# Patient Record
Sex: FEMALE | Race: WHITE | NOT HISPANIC OR LATINO | Employment: FULL TIME | URBAN - METROPOLITAN AREA
[De-identification: names, ages, dates, MRNs, and addresses within clinical notes are randomized per-mention and may not be internally consistent; named-entity substitution may affect disease eponyms.]

---

## 2020-09-21 ENCOUNTER — OFFICE VISIT (OUTPATIENT)
Dept: FAMILY MEDICINE CLINIC | Facility: CLINIC | Age: 45
End: 2020-09-21

## 2020-09-21 ENCOUNTER — HOSPITAL ENCOUNTER (OUTPATIENT)
Dept: GENERAL RADIOLOGY | Facility: HOSPITAL | Age: 45
Discharge: HOME | End: 2020-09-21
Admitting: PHYSICIAN ASSISTANT

## 2020-09-21 VITALS
WEIGHT: 293 LBS | BODY MASS INDEX: 43.4 KG/M2 | RESPIRATION RATE: 15 BRPM | TEMPERATURE: 98.4 F | HEART RATE: 73 BPM | DIASTOLIC BLOOD PRESSURE: 88 MMHG | OXYGEN SATURATION: 99 % | HEIGHT: 69 IN | SYSTOLIC BLOOD PRESSURE: 138 MMHG

## 2020-09-21 DIAGNOSIS — Z51.81 MEDICATION MONITORING ENCOUNTER: ICD-10-CM

## 2020-09-21 DIAGNOSIS — Z11.59 NEED FOR HEPATITIS C SCREENING TEST: ICD-10-CM

## 2020-09-21 DIAGNOSIS — E66.01 MORBID OBESITY (HCC): ICD-10-CM

## 2020-09-21 DIAGNOSIS — M54.12 CERVICAL RADICULOPATHY: ICD-10-CM

## 2020-09-21 DIAGNOSIS — Z13.6 ENCOUNTER FOR LIPID SCREENING FOR CARDIOVASCULAR DISEASE: ICD-10-CM

## 2020-09-21 DIAGNOSIS — I10 ESSENTIAL HYPERTENSION: Primary | ICD-10-CM

## 2020-09-21 DIAGNOSIS — F41.8 DEPRESSION WITH ANXIETY: ICD-10-CM

## 2020-09-21 DIAGNOSIS — R53.82 CHRONIC FATIGUE: ICD-10-CM

## 2020-09-21 DIAGNOSIS — M25.511 ACUTE PAIN OF RIGHT SHOULDER: ICD-10-CM

## 2020-09-21 DIAGNOSIS — R29.898 SHOULDER WEAKNESS: ICD-10-CM

## 2020-09-21 DIAGNOSIS — M67.911 DISORDER OF RIGHT ROTATOR CUFF: ICD-10-CM

## 2020-09-21 DIAGNOSIS — E55.9 VITAMIN D DEFICIENCY: ICD-10-CM

## 2020-09-21 DIAGNOSIS — Z13.220 ENCOUNTER FOR LIPID SCREENING FOR CARDIOVASCULAR DISEASE: ICD-10-CM

## 2020-09-21 DIAGNOSIS — S49.91XA INJURY OF RIGHT SHOULDER, INITIAL ENCOUNTER: ICD-10-CM

## 2020-09-21 PROCEDURE — 99204 OFFICE O/P NEW MOD 45 MIN: CPT | Performed by: PHYSICIAN ASSISTANT

## 2020-09-21 PROCEDURE — 73030 X-RAY EXAM OF SHOULDER: CPT

## 2020-09-21 PROCEDURE — 72050 X-RAY EXAM NECK SPINE 4/5VWS: CPT

## 2020-09-21 RX ORDER — ORPHENADRINE CITRATE 100 MG/1
100 TABLET, EXTENDED RELEASE ORAL 2 TIMES DAILY
COMMUNITY
End: 2021-09-16

## 2020-09-21 RX ORDER — FUROSEMIDE 20 MG/1
20 TABLET ORAL AS NEEDED
COMMUNITY
End: 2021-11-18 | Stop reason: SDUPTHER

## 2020-09-21 RX ORDER — POTASSIUM CHLORIDE 600 MG/1
8 TABLET, FILM COATED, EXTENDED RELEASE ORAL DAILY
COMMUNITY
End: 2021-11-18 | Stop reason: SDUPTHER

## 2020-09-21 RX ORDER — TIZANIDINE 4 MG/1
4 TABLET ORAL AS NEEDED
COMMUNITY
End: 2020-12-01 | Stop reason: SDUPTHER

## 2020-09-21 RX ORDER — LEVOCETIRIZINE DIHYDROCHLORIDE 5 MG/1
5 TABLET, FILM COATED ORAL EVERY EVENING
COMMUNITY
End: 2020-10-12 | Stop reason: SDUPTHER

## 2020-09-21 RX ORDER — ATENOLOL 100 MG/1
100 TABLET ORAL DAILY
COMMUNITY
End: 2020-10-12 | Stop reason: SDUPTHER

## 2020-09-21 RX ORDER — ARIPIPRAZOLE 2 MG/1
2 TABLET ORAL DAILY
Qty: 30 TABLET | Refills: 0 | Status: SHIPPED | OUTPATIENT
Start: 2020-09-21 | End: 2020-11-16

## 2020-09-21 RX ORDER — NAPROXEN 500 MG/1
500 TABLET ORAL
COMMUNITY
End: 2020-09-21

## 2020-09-21 RX ORDER — DOXYCYCLINE 50 MG/1
50 CAPSULE ORAL DAILY
COMMUNITY
End: 2021-02-24

## 2020-09-21 RX ORDER — VENLAFAXINE HYDROCHLORIDE 150 MG/1
150 CAPSULE, EXTENDED RELEASE ORAL DAILY
COMMUNITY
End: 2021-03-24 | Stop reason: SDUPTHER

## 2020-09-21 RX ORDER — KETOROLAC TROMETHAMINE 10 MG/1
10 TABLET, FILM COATED ORAL EVERY 6 HOURS PRN
COMMUNITY
End: 2021-09-16

## 2020-09-21 RX ORDER — PREDNISONE 10 MG/1
TABLET ORAL
Qty: 21 EACH | Refills: 0 | Status: SHIPPED | OUTPATIENT
Start: 2020-09-21 | End: 2021-02-24

## 2020-09-21 RX ORDER — LOSARTAN POTASSIUM 100 MG/1
100 TABLET ORAL DAILY
COMMUNITY
End: 2020-12-01

## 2020-09-21 RX ORDER — ESOMEPRAZOLE MAGNESIUM 40 MG/1
40 CAPSULE, DELAYED RELEASE ORAL
COMMUNITY
End: 2021-02-15 | Stop reason: SDUPTHER

## 2020-09-21 NOTE — PROGRESS NOTES
Subjective   Francia Rivera is a 45 y.o. female.     History of Present Illness   Pt presents to establish care. Former patient in [facility]. Recently moved here with    R shoulder pain. R hand feels weak. Started suddenly a few days ago. Reaching for something when it started hurting   Posterior shoulder blade and radiates down arm, Worse with laying down. R sided side sleeper. Right hand dominant   tendonitis of R shoulder in the past. This feels worse.   Feels like hot poker sensation going down arm.   Went to [facility] ER. Did not obtain XR. Normal EKG   Never had MRI of shoulder in the past   ER prescribed norflex and Toradol in ER 9/19/20. Pain not gotten any better. Shot in ER helped her sleep for a couple hours.    Does some lifting and pulling at work. 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   Low vit D   Thyroid abnormal, but did not go back for recheck as directed   No issues with blood sugar or cholesterol     Cardiac stress testing in the past was normal    swelling in LE taking lasix and klor-con   No hx of DVT   Has varicose veins     GERD history. Taking nexium   Rosacea, taking 50 mg capsule X last few months. Seems to be helping. Prescribed by PCP office       effexor worked well at first. Very ogden. Hx of anxiety and depression. Been on for 3 years. Does not seem like medicine is doing as much   Lexapro, wellbutrin, zoloft, buspar in the past- no major side effects, just felt like they did not help much      Notes chronic fatigue     Has HTN. Takes atenolol and losartan. Does not check BP at home. Denies cardiac symptoms     The following portions of the patient's history were reviewed and updated as appropriate: allergies, current  "medications, past family history, past medical history, past social history, past surgical history and problem list.    Review of Systems   Constitutional: Positive for fatigue. Negative for chills, diaphoresis and fever.   HENT: Negative.  Negative for congestion, ear discharge, ear pain, hearing loss, nosebleeds, postnasal drip, sinus pressure, sneezing and sore throat.    Eyes: Negative.    Respiratory: Negative.  Negative for cough, chest tightness, shortness of breath and wheezing.    Cardiovascular: Positive for leg swelling. Negative for chest pain and palpitations.   Gastrointestinal: Negative for abdominal distention, abdominal pain, blood in stool, constipation, diarrhea, nausea and vomiting.   Genitourinary: Negative.  Negative for difficulty urinating, dysuria, flank pain, frequency, hematuria and urgency.   Musculoskeletal: Positive for arthralgias, back pain, neck pain and neck stiffness. Negative for gait problem, joint swelling and myalgias.   Skin: Negative.  Negative for color change, pallor, rash and wound.   Neurological: Positive for weakness. Negative for dizziness, syncope, light-headedness, numbness and headaches.       Objective    Blood pressure 138/88, pulse 73, temperature 98.4 °F (36.9 °C), resp. rate 15, height 175.3 cm (69\"), weight (!) 139 kg (307 lb), SpO2 99 %.   Body mass index is 45.34 kg/m².     Physical Exam  Vitals signs and nursing note reviewed.   Constitutional:       Appearance: She is well-developed. She is obese.   HENT:      Head: Normocephalic and atraumatic.      Right Ear: Tympanic membrane, ear canal and external ear normal.      Left Ear: Tympanic membrane, ear canal and external ear normal.      Nose: Nose normal.      Mouth/Throat:      Pharynx: No oropharyngeal exudate.   Eyes:      Conjunctiva/sclera: Conjunctivae normal.   Neck:      Musculoskeletal: Normal range of motion and neck supple.      Thyroid: No thyromegaly.      Trachea: No tracheal deviation. "   Cardiovascular:      Rate and Rhythm: Normal rate and regular rhythm.      Heart sounds: Normal heart sounds.   Pulmonary:      Effort: Pulmonary effort is normal. No respiratory distress.      Breath sounds: Normal breath sounds. No wheezing or rales.   Chest:      Chest wall: No tenderness.   Abdominal:      General: Bowel sounds are normal. There is no distension.      Palpations: Abdomen is soft. There is no mass.      Tenderness: There is no abdominal tenderness. There is no guarding or rebound.      Hernia: No hernia is present.   Musculoskeletal:         General: No deformity.      Right shoulder: She exhibits decreased range of motion, tenderness, bony tenderness, pain and decreased strength.      Cervical back: She exhibits tenderness and bony tenderness. She exhibits normal range of motion.   Lymphadenopathy:      Cervical: No cervical adenopathy.   Skin:     General: Skin is warm and dry.   Neurological:      Mental Status: She is alert and oriented to person, place, and time.   Psychiatric:         Behavior: Behavior normal.         Thought Content: Thought content normal.         Judgment: Judgment normal.         Assessment/Plan   Francia was seen today for establish care and shoulder pain.    Diagnoses and all orders for this visit:    Essential hypertension  -     CBC & Differential  -     Comprehensive Metabolic Panel    Chronic fatigue  -     TSH  -     T4, free  -     Iron Profile  -     Vitamin B12    Vitamin D deficiency  -     Vitamin D 25 Hydroxy    Encounter for lipid screening for cardiovascular disease  -     Lipid Panel    Need for hepatitis C screening test  -     Hepatitis C Antibody    Medication monitoring encounter  -     CBC & Differential  -     Comprehensive Metabolic Panel  -     Magnesium    Depression with anxiety  -     ARIPiprazole (Abilify) 2 MG tablet; Take 1 tablet by mouth Daily.    Acute pain of right shoulder  -     predniSONE (DELTASONE) 10 MG (21) dose pack; Use as  directed on package  -     MRI Shoulder Right Without Contrast  -     XR Shoulder 2+ View Right    Cervical radiculopathy  -     predniSONE (DELTASONE) 10 MG (21) dose pack; Use as directed on package  -     XR Spine Cervical Complete 4 or 5 View    Shoulder weakness  -     MRI Shoulder Right Without Contrast    Injury of right shoulder, initial encounter  -     MRI Shoulder Right Without Contrast  -     XR Shoulder 2+ View Right    Disorder of right rotator cuff  -     MRI Shoulder Right Without Contrast    Morbid obesity (CMS/HCC)    Steroid taper as noted for shoulder pain. No additional NSAIDs while taking. May take muscle relaxer prn   Issue may be stemming from cervical spine with signs of radiculopathy   Check imaging as outlined in plan   Labs as outlined in plan. Will be due for all med refills in next few weeks   Will add Abilify to effexor due to persistent depression with anxiety

## 2020-09-22 LAB
25(OH)D3+25(OH)D2 SERPL-MCNC: 31 NG/ML (ref 30–100)
ALBUMIN SERPL-MCNC: 4.1 G/DL (ref 3.8–4.8)
ALBUMIN/GLOB SERPL: 1.4 {RATIO} (ref 1.2–2.2)
ALP SERPL-CCNC: 101 IU/L (ref 39–117)
ALT SERPL-CCNC: 20 IU/L (ref 0–32)
AST SERPL-CCNC: 16 IU/L (ref 0–40)
BASOPHILS # BLD AUTO: 0 X10E3/UL (ref 0–0.2)
BASOPHILS NFR BLD AUTO: 0 %
BILIRUB SERPL-MCNC: <0.2 MG/DL (ref 0–1.2)
BUN SERPL-MCNC: 20 MG/DL (ref 6–24)
BUN/CREAT SERPL: 24 (ref 9–23)
CALCIUM SERPL-MCNC: 9.3 MG/DL (ref 8.7–10.2)
CHLORIDE SERPL-SCNC: 101 MMOL/L (ref 96–106)
CHOLEST SERPL-MCNC: 156 MG/DL (ref 100–199)
CO2 SERPL-SCNC: 29 MMOL/L (ref 20–29)
CREAT SERPL-MCNC: 0.83 MG/DL (ref 0.57–1)
EOSINOPHIL # BLD AUTO: 0.1 X10E3/UL (ref 0–0.4)
EOSINOPHIL NFR BLD AUTO: 1 %
ERYTHROCYTE [DISTWIDTH] IN BLOOD BY AUTOMATED COUNT: 13.4 % (ref 11.7–15.4)
GLOBULIN SER CALC-MCNC: 2.9 G/DL (ref 1.5–4.5)
GLUCOSE SERPL-MCNC: 90 MG/DL (ref 65–99)
HCT VFR BLD AUTO: 42.2 % (ref 34–46.6)
HCV AB S/CO SERPL IA: <0.1 S/CO RATIO (ref 0–0.9)
HDLC SERPL-MCNC: 60 MG/DL
HGB BLD-MCNC: 13.6 G/DL (ref 11.1–15.9)
IMM GRANULOCYTES # BLD AUTO: 0 X10E3/UL (ref 0–0.1)
IMM GRANULOCYTES NFR BLD AUTO: 1 %
IRON SATN MFR SERPL: 12 % (ref 15–55)
IRON SERPL-MCNC: 50 UG/DL (ref 27–159)
LDLC SERPL CALC-MCNC: 75 MG/DL (ref 0–99)
LYMPHOCYTES # BLD AUTO: 3 X10E3/UL (ref 0.7–3.1)
LYMPHOCYTES NFR BLD AUTO: 34 %
MAGNESIUM SERPL-MCNC: 2.5 MG/DL (ref 1.6–2.3)
MCH RBC QN AUTO: 28.3 PG (ref 26.6–33)
MCHC RBC AUTO-ENTMCNC: 32.2 G/DL (ref 31.5–35.7)
MCV RBC AUTO: 88 FL (ref 79–97)
MONOCYTES # BLD AUTO: 0.6 X10E3/UL (ref 0.1–0.9)
MONOCYTES NFR BLD AUTO: 6 %
NEUTROPHILS # BLD AUTO: 5.2 X10E3/UL (ref 1.4–7)
NEUTROPHILS NFR BLD AUTO: 58 %
PLATELET # BLD AUTO: 270 X10E3/UL (ref 150–450)
POTASSIUM SERPL-SCNC: 4.4 MMOL/L (ref 3.5–5.2)
PROT SERPL-MCNC: 7 G/DL (ref 6–8.5)
RBC # BLD AUTO: 4.81 X10E6/UL (ref 3.77–5.28)
SODIUM SERPL-SCNC: 142 MMOL/L (ref 134–144)
T4 FREE SERPL-MCNC: 1.05 NG/DL (ref 0.82–1.77)
TIBC SERPL-MCNC: 426 UG/DL (ref 250–450)
TRIGL SERPL-MCNC: 120 MG/DL (ref 0–149)
TSH SERPL DL<=0.005 MIU/L-ACNC: 0.76 UIU/ML (ref 0.45–4.5)
UIBC SERPL-MCNC: 376 UG/DL (ref 131–425)
VIT B12 SERPL-MCNC: 553 PG/ML (ref 232–1245)
VLDLC SERPL CALC-MCNC: 21 MG/DL (ref 5–40)
WBC # BLD AUTO: 8.8 X10E3/UL (ref 3.4–10.8)

## 2020-09-23 ENCOUNTER — TELEPHONE (OUTPATIENT)
Dept: FAMILY MEDICINE CLINIC | Facility: CLINIC | Age: 45
End: 2020-09-23

## 2020-10-06 DIAGNOSIS — M75.101 TEAR OF RIGHT SUPRASPINATUS TENDON: Primary | ICD-10-CM

## 2020-10-12 ENCOUNTER — APPOINTMENT (OUTPATIENT)
Dept: MRI IMAGING | Facility: HOSPITAL | Age: 45
End: 2020-10-12

## 2020-10-12 NOTE — TELEPHONE ENCOUNTER
DELETE AFTER REVIEWING: Telephone encounter to be sent to the clinical pool.  If patient has less than a 3 day supply left, send the encounter HIGH Priority.    Caller: Nicole Francia EASTON    Relationship: Self    Best call back number:   [phone]  Medication needed:   Requested Prescriptions     Pending Prescriptions Disp Refills   • atenolol (TENORMIN) 100 MG tablet       Sig: Take 1 tablet by mouth Daily.   • levocetirizine (XYZAL) 5 MG tablet       Sig: Take 1 tablet by mouth Every Evening.     What is the patient's preferred pharmacy: [facility]Y #[id number] [place], [place] - [address] [place] [address] AT [facility] - [phone]  - [phone]      SHE ALSO NEEDS ESTRADIOL 1 MG

## 2020-10-13 RX ORDER — ATENOLOL 100 MG/1
100 TABLET ORAL DAILY
Qty: 90 TABLET | Refills: 0 | Status: SHIPPED | OUTPATIENT
Start: 2020-10-13 | End: 2021-01-05

## 2020-10-13 RX ORDER — LEVOCETIRIZINE DIHYDROCHLORIDE 5 MG/1
5 TABLET, FILM COATED ORAL EVERY EVENING
Qty: 90 TABLET | Refills: 0 | Status: SHIPPED | OUTPATIENT
Start: 2020-10-13 | End: 2021-09-16

## 2020-10-22 ENCOUNTER — OFFICE VISIT (OUTPATIENT)
Dept: ORTHOPEDIC SURGERY | Facility: CLINIC | Age: 45
End: 2020-10-22

## 2020-10-22 VITALS — BODY MASS INDEX: 43.4 KG/M2 | HEIGHT: 69 IN | HEART RATE: 81 BPM | WEIGHT: 293 LBS | OXYGEN SATURATION: 99 %

## 2020-10-22 DIAGNOSIS — R20.2 NUMBNESS AND TINGLING IN RIGHT HAND: ICD-10-CM

## 2020-10-22 DIAGNOSIS — M54.2 NECK PAIN: ICD-10-CM

## 2020-10-22 DIAGNOSIS — M75.111 NONTRAUMATIC INCOMPLETE TEAR OF RIGHT ROTATOR CUFF: Primary | ICD-10-CM

## 2020-10-22 DIAGNOSIS — R20.0 NUMBNESS AND TINGLING IN RIGHT HAND: ICD-10-CM

## 2020-10-22 DIAGNOSIS — M75.51 BURSITIS OF RIGHT SHOULDER: ICD-10-CM

## 2020-10-22 DIAGNOSIS — M75.41 IMPINGEMENT SYNDROME OF RIGHT SHOULDER: ICD-10-CM

## 2020-10-22 PROCEDURE — 99204 OFFICE O/P NEW MOD 45 MIN: CPT | Performed by: ORTHOPAEDIC SURGERY

## 2020-10-22 RX ORDER — NAPROXEN 500 MG/1
TABLET ORAL
COMMUNITY
Start: 2020-10-04 | End: 2021-09-16

## 2020-10-22 RX ORDER — LOSARTAN POTASSIUM 50 MG/1
TABLET ORAL
COMMUNITY
Start: 2020-10-07 | End: 2020-12-01

## 2020-10-22 RX ORDER — METRONIDAZOLE 7.5 MG/G
GEL TOPICAL
COMMUNITY
Start: 2020-10-06 | End: 2022-10-19 | Stop reason: SDUPTHER
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
## 2020-10-29 ENCOUNTER — HOSPITAL ENCOUNTER (OUTPATIENT)
Dept: MRI IMAGING | Facility: HOSPITAL | Age: 45
Discharge: HOME | End: 2020-10-29
Admitting: ORTHOPAEDIC SURGERY

## 2020-10-29 DIAGNOSIS — R20.0 NUMBNESS AND TINGLING IN RIGHT HAND: ICD-10-CM

## 2020-10-29 DIAGNOSIS — M54.2 NECK PAIN: ICD-10-CM

## 2020-10-29 DIAGNOSIS — R20.2 NUMBNESS AND TINGLING IN RIGHT HAND: ICD-10-CM

## 2020-10-29 PROCEDURE — 72141 MRI NECK SPINE W/O DYE: CPT

## 2020-11-03 ENCOUNTER — TELEPHONE (OUTPATIENT)
Dept: ORTHOPEDIC SURGERY | Facility: CLINIC | Age: 45
End: 2020-11-03

## 2020-11-03 ENCOUNTER — TELEPHONE (OUTPATIENT)
Dept: FAMILY MEDICINE CLINIC | Facility: CLINIC | Age: 45
End: 2020-11-03

## 2020-11-03 DIAGNOSIS — R23.2 HOT FLASHES: Primary | ICD-10-CM

## 2020-11-03 RX ORDER — ESTRADIOL 1 MG/1
1 TABLET ORAL DAILY
Qty: 30 TABLET | Refills: 2 | Status: SHIPPED | OUTPATIENT
Start: 2020-11-03 | End: 2021-04-19

## 2020-11-03 NOTE — TELEPHONE ENCOUNTER
Patient said she was prescribed it on April 18, 2018, the day she had a complete hysterectomy. She said she has hot flashes, night sweats, and irritability. Per patient and [facility] it is Estradiol 1 mg one capsule po qd #30.  Patient said her nurse practioner at her other practice prescribed it for her. Patient asked if there was any way you could take it over, she said she doesn't have a lot of extra money to go to other appointments.

## 2020-11-03 NOTE — TELEPHONE ENCOUNTER
Spoke with pt letting her know [employer] message; She reports being scheduled for her EMG but does not remember exactly what the date was. She reports being scheduled to see Dr. Ordoñez on 11/19 and I told her to keep that appt if her EMG study has been done but if for some reason she hasn't had the EMG by that time, I told her to give us a call to re-schedule that appt. She understood.    Felecia

## 2020-11-03 NOTE — TELEPHONE ENCOUNTER
Regarding: Prescription Question  Contact: [phone]  ----- Message from CELENA Aguilar sent at 11/3/2020  8:40 AM EST -----       ----- Message from Francia Rivera to Mario Rainey PA sent at 11/3/2020  5:10 AM -----   Estirdol  Learn more  Comments: This was given to me by BRANDON Bolanos. 1 mg once a day. I need it refilled

## 2020-11-03 NOTE — TELEPHONE ENCOUNTER
Mario Rainey PA 6 hours ago (8:40 AM)        Not in medication list. Please call patient and pharmacy to confirm exact prescription, dosing and quantity. What are her menopausal symptoms? How long has she been on medication. Has she had a complete hysterectomy or just partial?  I do not typically manage hormone therapy, however I will provide Rx until we can get her established with local GYN. CHERELLE

## 2020-11-03 NOTE — TELEPHONE ENCOUNTER
Rx sent to pharmacy. I will take over for awhile, however do encourage patient to stay on top of yearly mammograms and consider stopping HRT after she has been on it for 5 years. CHERELLE

## 2020-11-03 NOTE — TELEPHONE ENCOUNTER
----- Message from Fortino Ordoñez MD sent at 11/3/2020 12:20 PM EST -----  Please inform the patient the I have reviewed her neck MRI.  She does have some mild disc herniation changes but should be able to respond to conservative treatment.  The EMG/NCV study will still be helpful (can we see if this is scheduled?)    I am happy to see her to go over the results and reassess.  ----- Message -----  From: Interface, Rad [place] Valves In  Sent: 10/30/2020   1:12 PM EST  To: Fortino Ordoñez MD

## 2020-11-13 DIAGNOSIS — R20.2 NUMBNESS AND TINGLING IN RIGHT HAND: ICD-10-CM

## 2020-11-13 DIAGNOSIS — R20.0 NUMBNESS AND TINGLING IN RIGHT HAND: ICD-10-CM

## 2020-11-13 DIAGNOSIS — M54.2 NECK PAIN: ICD-10-CM

## 2020-11-14 DIAGNOSIS — F41.8 DEPRESSION WITH ANXIETY: ICD-10-CM

## 2020-11-16 RX ORDER — ARIPIPRAZOLE 2 MG/1
TABLET ORAL
Qty: 30 TABLET | Refills: 0 | Status: SHIPPED | OUTPATIENT
Start: 2020-11-16 | End: 2020-12-15

## 2020-11-20 ENCOUNTER — TELEPHONE (OUTPATIENT)
Dept: ORTHOPEDIC SURGERY | Facility: CLINIC | Age: 45
End: 2020-11-20

## 2020-12-01 RX ORDER — LOSARTAN POTASSIUM 100 MG/1
100 TABLET ORAL DAILY
Qty: 90 TABLET | Refills: 0 | Status: SHIPPED | OUTPATIENT
Start: 2020-12-01 | End: 2021-03-29

## 2020-12-01 RX ORDER — TIZANIDINE 4 MG/1
4 TABLET ORAL EVERY 8 HOURS PRN
Qty: 90 TABLET | Refills: 3 | Status: SHIPPED | OUTPATIENT
Start: 2020-12-01 | End: 2021-03-24 | Stop reason: SDUPTHER

## 2020-12-15 DIAGNOSIS — F41.8 DEPRESSION WITH ANXIETY: ICD-10-CM

## 2020-12-15 RX ORDER — ARIPIPRAZOLE 2 MG/1
TABLET ORAL
Qty: 30 TABLET | Refills: 0 | Status: SHIPPED | OUTPATIENT
Start: 2020-12-15 | End: 2021-06-19 | Stop reason: SDUPTHER

## 2021-01-05 RX ORDER — ATENOLOL 100 MG/1
TABLET ORAL
Qty: 90 TABLET | Refills: 0 | Status: SHIPPED | OUTPATIENT
Start: 2021-01-05 | End: 2021-04-05

## 2021-02-15 RX ORDER — ESOMEPRAZOLE MAGNESIUM 40 MG/1
40 CAPSULE, DELAYED RELEASE ORAL
Qty: 90 CAPSULE | Refills: 1 | Status: SHIPPED | OUTPATIENT
Start: 2021-02-15 | End: 2021-06-19 | Stop reason: SDUPTHER

## 2021-02-24 ENCOUNTER — OFFICE VISIT (OUTPATIENT)
Dept: FAMILY MEDICINE CLINIC | Facility: CLINIC | Age: 46
End: 2021-02-24

## 2021-02-24 VITALS
HEART RATE: 78 BPM | BODY MASS INDEX: 43.4 KG/M2 | DIASTOLIC BLOOD PRESSURE: 82 MMHG | TEMPERATURE: 98.2 F | WEIGHT: 293 LBS | SYSTOLIC BLOOD PRESSURE: 124 MMHG | HEIGHT: 69 IN | RESPIRATION RATE: 18 BRPM

## 2021-02-24 DIAGNOSIS — R07.9 CHEST PAIN, UNSPECIFIED TYPE: ICD-10-CM

## 2021-02-24 DIAGNOSIS — R11.0 NAUSEA: ICD-10-CM

## 2021-02-24 DIAGNOSIS — R10.13 EPIGASTRIC PAIN: Primary | ICD-10-CM

## 2021-02-24 LAB
BILIRUB BLD-MCNC: NEGATIVE MG/DL
CLARITY, POC: CLEAR
COLOR UR: YELLOW
GLUCOSE UR STRIP-MCNC: NEGATIVE MG/DL
KETONES UR QL: NEGATIVE
LEUKOCYTE EST, POC: NEGATIVE
NITRITE UR-MCNC: NEGATIVE MG/ML
PH UR: 8 [PH] (ref 5–8)
PROT UR STRIP-MCNC: ABNORMAL MG/DL
RBC # UR STRIP: NEGATIVE /UL
SP GR UR: 1.01 (ref 1–1.03)
UROBILINOGEN UR QL: NORMAL

## 2021-02-24 PROCEDURE — 81003 URINALYSIS AUTO W/O SCOPE: CPT | Performed by: PHYSICIAN ASSISTANT

## 2021-02-24 PROCEDURE — 99213 OFFICE O/P EST LOW 20 MIN: CPT | Performed by: PHYSICIAN ASSISTANT

## 2021-02-24 RX ORDER — SUCRALFATE 1 G/1
1 TABLET ORAL 4 TIMES DAILY
Qty: 60 TABLET | Refills: 0 | Status: SHIPPED | OUTPATIENT
Start: 2021-02-24 | End: 2021-04-12

## 2021-02-24 RX ORDER — CLINDAMYCIN HYDROCHLORIDE 300 MG/1
CAPSULE ORAL
COMMUNITY
Start: 2021-02-22 | End: 2021-09-16

## 2021-02-24 RX ORDER — HYDROCODONE BITARTRATE AND ACETAMINOPHEN 5; 325 MG/1; MG/1
TABLET ORAL
COMMUNITY
Start: 2021-02-22 | End: 2021-09-16

## 2021-02-24 RX ORDER — ONDANSETRON 4 MG/1
4 TABLET, ORALLY DISINTEGRATING ORAL EVERY 8 HOURS PRN
Qty: 20 TABLET | Refills: 0 | Status: SHIPPED | OUTPATIENT
Start: 2021-02-24

## 2021-02-24 NOTE — PROGRESS NOTES
Subjective   Francia Rivera is a 45 y.o. female.     History of Present Illness   Pt presents with CC of abdominal pain that started over the weekend   Pain is in epigastric region   Having decreased appetite, nausea and some heartburn   Some heartburn (despite taking nexium) and chest pain   Notes she has been taking a lot of ibuprofen over the last several months  Pt does have hx of stomach ulcer  No recent EGD   Hx of gallbladder surgery   No fever, vomiting or URI symptoms   Last BM 5 days ago. States it is normal for her to go this long without BM. No noticed blood or tarry stools as she has not gone in several days  No hx of pancreatitis     The following portions of the patient's history were reviewed and updated as appropriate: allergies, current medications, past family history, past medical history, past social history, past surgical history and problem list.    Review of Systems   Constitutional: Positive for appetite change. Negative for chills, diaphoresis, fatigue and fever.   HENT: Negative.  Negative for congestion, ear discharge, ear pain, hearing loss, nosebleeds, postnasal drip, sinus pressure, sneezing and sore throat.    Eyes: Negative.    Respiratory: Negative.  Negative for cough, chest tightness, shortness of breath and wheezing.    Cardiovascular: Positive for chest pain. Negative for palpitations and leg swelling.   Gastrointestinal: Positive for abdominal pain, constipation and nausea. Negative for abdominal distention, blood in stool, diarrhea and vomiting.   Genitourinary: Negative.  Negative for difficulty urinating, dysuria, flank pain, frequency, hematuria and urgency.   Musculoskeletal: Negative.  Negative for arthralgias, back pain, gait problem, joint swelling, myalgias, neck pain and neck stiffness.   Skin: Negative.  Negative for color change, pallor, rash and wound.   Neurological: Negative for dizziness, syncope, weakness, light-headedness, numbness and headaches.  "      Objective    Blood pressure 124/82, pulse 78, temperature 98.2 °F (36.8 °C), resp. rate 18, height 175.3 cm (69\"), weight 134 kg (296 lb).     Physical Exam  Vitals signs and nursing note reviewed.   Constitutional:       Appearance: Normal appearance. She is well-developed.      Comments: Appears in pain    HENT:      Head: Normocephalic and atraumatic.      Right Ear: Tympanic membrane, ear canal and external ear normal.      Left Ear: Tympanic membrane, ear canal and external ear normal.      Nose: Nose normal.   Eyes:      Conjunctiva/sclera: Conjunctivae normal.   Neck:      Musculoskeletal: Normal range of motion and neck supple.      Thyroid: No thyromegaly.      Trachea: No tracheal deviation.   Cardiovascular:      Rate and Rhythm: Normal rate and regular rhythm.      Heart sounds: Normal heart sounds. No murmur.   Pulmonary:      Effort: Pulmonary effort is normal. No respiratory distress.      Breath sounds: Normal breath sounds. No wheezing or rales.   Chest:      Chest wall: No tenderness.   Abdominal:      General: Bowel sounds are normal. There is no distension.      Palpations: Abdomen is soft. There is no mass.      Tenderness: There is abdominal tenderness in the epigastric area. There is no guarding or rebound.      Hernia: No hernia is present.   Lymphadenopathy:      Cervical: No cervical adenopathy.   Skin:     General: Skin is warm and dry.   Neurological:      Mental Status: She is alert and oriented to person, place, and time.   Psychiatric:         Mood and Affect: Mood normal.         Behavior: Behavior normal.         Thought Content: Thought content normal.         Judgment: Judgment normal.     Procedures      Assessment/Plan   Diagnoses and all orders for this visit:    1. Epigastric pain (Primary)  -     POCT urinalysis dipstick, automated  -     CBC & Differential  -     Comprehensive Metabolic Panel  -     Lipase  -     CT Abdomen Pelvis With Contrast  -     sucralfate " (Carafate) 1 g tablet; Take 1 tablet by mouth 4 (Four) Times a Day.  Dispense: 60 tablet; Refill: 0    2. Nausea  -     POCT urinalysis dipstick, automated  -     CBC & Differential  -     Comprehensive Metabolic Panel  -     Lipase  -     CT Abdomen Pelvis With Contrast  -     ondansetron ODT (Zofran ODT) 4 MG disintegrating tablet; Place 1 tablet on the tongue Every 8 (Eight) Hours As Needed for Nausea or Vomiting.  Dispense: 20 tablet; Refill: 0    3. Chest pain, unspecified type  -     Cancel: ECG 12 Lead  -     CT Abdomen Pelvis With Contrast    EKG ordered, however patient had to leave suddenly due to family emergency   Would like to obtain CT scan   Possible ulceration or gastritis due to frequent NSAID use   Symptomatic treatment as outlined in plan. Samples of dexilant provided to replace Nexium.  Pt to follow back up when she can

## 2021-02-25 ENCOUNTER — PATIENT MESSAGE (OUTPATIENT)
Dept: FAMILY MEDICINE CLINIC | Facility: CLINIC | Age: 46
End: 2021-02-25

## 2021-02-25 NOTE — TELEPHONE ENCOUNTER
From: Francia Rivera  Sent: 2/25/2021 11:36 AM EST  To: [facility]  Subject: RE: Visit Follow-Up Question    I had to leave early an she was going to do some test on me. I wasn’t sure if I needed to come back in or would they called to schedule the test?

## 2021-03-10 ENCOUNTER — APPOINTMENT (OUTPATIENT)
Dept: CT IMAGING | Facility: HOSPITAL | Age: 46
End: 2021-03-10

## 2021-03-24 DIAGNOSIS — F41.8 DEPRESSION WITH ANXIETY: Primary | ICD-10-CM

## 2021-03-24 RX ORDER — VENLAFAXINE HYDROCHLORIDE 150 MG/1
150 CAPSULE, EXTENDED RELEASE ORAL DAILY
Qty: 30 CAPSULE | Refills: 0 | Status: SHIPPED | OUTPATIENT
Start: 2021-03-24 | End: 2021-04-19

## 2021-03-29 RX ORDER — TIZANIDINE 4 MG/1
4 TABLET ORAL EVERY 8 HOURS PRN
Qty: 90 TABLET | Refills: 3 | Status: SHIPPED | OUTPATIENT
Start: 2021-03-29 | End: 2021-06-19 | Stop reason: SDUPTHER

## 2021-03-29 RX ORDER — LOSARTAN POTASSIUM 100 MG/1
TABLET ORAL
Qty: 90 TABLET | Refills: 0 | Status: SHIPPED | OUTPATIENT
Start: 2021-03-29 | End: 2021-06-19 | Stop reason: SDUPTHER

## 2021-04-05 RX ORDER — ATENOLOL 100 MG/1
TABLET ORAL
Qty: 90 TABLET | Refills: 0 | Status: SHIPPED | OUTPATIENT
Start: 2021-04-05 | End: 2021-04-27 | Stop reason: SDUPTHER

## 2021-04-12 DIAGNOSIS — R10.13 EPIGASTRIC PAIN: ICD-10-CM

## 2021-04-12 RX ORDER — SUCRALFATE 1 G/1
1 TABLET ORAL 4 TIMES DAILY
Qty: 60 TABLET | Refills: 0 | Status: SHIPPED | OUTPATIENT
Start: 2021-04-12 | End: 2022-05-31

## 2021-04-19 DIAGNOSIS — F41.8 DEPRESSION WITH ANXIETY: ICD-10-CM

## 2021-04-19 DIAGNOSIS — R23.2 HOT FLASHES: ICD-10-CM

## 2021-04-19 RX ORDER — VENLAFAXINE HYDROCHLORIDE 150 MG/1
CAPSULE, EXTENDED RELEASE ORAL
Qty: 30 CAPSULE | Refills: 0 | Status: SHIPPED | OUTPATIENT
Start: 2021-04-19 | End: 2021-05-19

## 2021-04-19 RX ORDER — ESTRADIOL 1 MG/1
TABLET ORAL
Qty: 30 TABLET | Refills: 2 | Status: SHIPPED | OUTPATIENT
Start: 2021-04-19 | End: 2021-06-19 | Stop reason: SDUPTHER

## 2021-04-27 RX ORDER — ATENOLOL 100 MG/1
100 TABLET ORAL DAILY
Qty: 90 TABLET | Refills: 0 | Status: SHIPPED | OUTPATIENT
Start: 2021-04-27 | End: 2021-06-19 | Stop reason: SDUPTHER

## 2021-05-19 DIAGNOSIS — F41.8 DEPRESSION WITH ANXIETY: ICD-10-CM

## 2021-05-19 RX ORDER — VENLAFAXINE HYDROCHLORIDE 150 MG/1
CAPSULE, EXTENDED RELEASE ORAL
Qty: 30 CAPSULE | Refills: 2 | Status: SHIPPED | OUTPATIENT
Start: 2021-05-19 | End: 2021-06-19 | Stop reason: SDUPTHER

## 2021-06-19 DIAGNOSIS — R23.2 HOT FLASHES: ICD-10-CM

## 2021-06-19 DIAGNOSIS — F41.8 DEPRESSION WITH ANXIETY: ICD-10-CM

## 2021-06-21 RX ORDER — ESTRADIOL 1 MG/1
1 TABLET ORAL DAILY
Qty: 30 TABLET | Refills: 2 | Status: SHIPPED | OUTPATIENT
Start: 2021-06-21 | End: 2021-10-18

## 2021-06-21 RX ORDER — ESOMEPRAZOLE MAGNESIUM 40 MG/1
40 CAPSULE, DELAYED RELEASE ORAL
Qty: 90 CAPSULE | Refills: 1 | Status: SHIPPED | OUTPATIENT
Start: 2021-06-21 | End: 2022-01-20

## 2021-06-21 RX ORDER — VENLAFAXINE HYDROCHLORIDE 150 MG/1
150 CAPSULE, EXTENDED RELEASE ORAL DAILY
Qty: 30 CAPSULE | Refills: 2 | Status: SHIPPED | OUTPATIENT
Start: 2021-06-21 | End: 2021-10-04

## 2021-06-21 RX ORDER — ARIPIPRAZOLE 2 MG/1
2 TABLET ORAL DAILY
Qty: 30 TABLET | Refills: 2 | Status: SHIPPED | OUTPATIENT
Start: 2021-06-21 | End: 2021-09-16

## 2021-06-21 RX ORDER — LOSARTAN POTASSIUM 100 MG/1
100 TABLET ORAL DAILY
Qty: 90 TABLET | Refills: 0 | Status: SHIPPED | OUTPATIENT
Start: 2021-06-21 | End: 2021-09-16

## 2021-06-21 RX ORDER — ATENOLOL 100 MG/1
100 TABLET ORAL DAILY
Qty: 90 TABLET | Refills: 0 | Status: SHIPPED | OUTPATIENT
Start: 2021-06-21 | End: 2022-02-01

## 2021-06-21 RX ORDER — TIZANIDINE 4 MG/1
4 TABLET ORAL EVERY 8 HOURS PRN
Qty: 90 TABLET | Refills: 3 | Status: SHIPPED | OUTPATIENT
Start: 2021-06-21 | End: 2021-12-20

## 2021-09-16 ENCOUNTER — OFFICE VISIT (OUTPATIENT)
Dept: FAMILY MEDICINE CLINIC | Facility: CLINIC | Age: 46
End: 2021-09-16

## 2021-09-16 VITALS
SYSTOLIC BLOOD PRESSURE: 132 MMHG | WEIGHT: 290.8 LBS | TEMPERATURE: 97.8 F | HEART RATE: 76 BPM | HEIGHT: 69 IN | RESPIRATION RATE: 12 BRPM | DIASTOLIC BLOOD PRESSURE: 70 MMHG | OXYGEN SATURATION: 98 % | BODY MASS INDEX: 43.07 KG/M2

## 2021-09-16 DIAGNOSIS — E55.9 VITAMIN D DEFICIENCY: ICD-10-CM

## 2021-09-16 DIAGNOSIS — R20.2 PINS AND NEEDLES SENSATION: ICD-10-CM

## 2021-09-16 DIAGNOSIS — E53.8 VITAMIN B12 DEFICIENCY: ICD-10-CM

## 2021-09-16 DIAGNOSIS — M25.50 ARTHRALGIA, UNSPECIFIED JOINT: Primary | ICD-10-CM

## 2021-09-16 PROCEDURE — 99213 OFFICE O/P EST LOW 20 MIN: CPT | Performed by: PHYSICIAN ASSISTANT

## 2021-09-16 RX ORDER — LOSARTAN POTASSIUM 100 MG/1
TABLET ORAL
Qty: 90 TABLET | Refills: 1 | Status: SHIPPED | OUTPATIENT
Start: 2021-09-16 | End: 2022-03-21

## 2021-09-16 NOTE — TELEPHONE ENCOUNTER
Rx Refill Note  Requested Prescriptions     Pending Prescriptions Disp Refills   • losartan (COZAAR) 100 MG tablet [Pharmacy Med Name: LOSARTAN POTASSIUM 100 MG TAB] 90 tablet 0     Sig: TAKE 1 TABLET BY MOUTH EVERY DAY      Last office visit with prescribing clinician: 2/24/2021      Next office visit with prescribing clinician: 9/16/2021            Irina Marin  09/16/21, 07:52 EDT

## 2021-09-16 NOTE — PROGRESS NOTES
"Subjective   Francia Rivera is a 46 y.o. female.     History of Present Illness   Pt presents with CC of of arthralgia of both shoulders, L knee, both ankles and heels. Hands feel stiff and hurt. Overall just achy all over. Symptoms have been going on for about the last month.   Has had medication change of lamictal 25 mg by psychiatry, however this was just started last week after symptoms started  Mother has lupus. No other known autoimmune issues in family   No recent injury or strenuous activity. Job is pretty sedentary   Does feel swollen in hands and feet at times   Sometimes gets pins and needles sensation in hands. Has suspected carpal tunnel     The following portions of the patient's history were reviewed and updated as appropriate: allergies, current medications, past family history, past medical history, past social history, past surgical history and problem list.    Review of Systems   Constitutional: Negative for chills and fever.   Respiratory: Negative for cough, choking, shortness of breath and wheezing.    Cardiovascular: Negative for chest pain.   Gastrointestinal: Negative for diarrhea, nausea and vomiting.   Musculoskeletal: Positive for arthralgias and joint swelling. Negative for myalgias.   Skin: Negative for rash.   Neurological: Negative for dizziness and headaches.       Objective    Blood pressure 132/70, pulse 76, temperature 97.8 °F (36.6 °C), temperature source Temporal, resp. rate 12, height 175.3 cm (69\"), weight 132 kg (290 lb 12.8 oz), SpO2 98 %.     Physical Exam  Vitals and nursing note reviewed.   Constitutional:       Appearance: Normal appearance.   HENT:      Right Ear: External ear normal.      Left Ear: External ear normal.      Nose: Nose normal.   Eyes:      Conjunctiva/sclera: Conjunctivae normal.   Cardiovascular:      Rate and Rhythm: Normal rate and regular rhythm.      Heart sounds: Normal heart sounds.   Pulmonary:      Effort: Pulmonary effort is normal. No " respiratory distress.      Breath sounds: Normal breath sounds. No wheezing or rhonchi.   Musculoskeletal:      Comments: Diffuse bony tenderness, to shoulder, ankles, and heels bilaterally. TTP of L knee    Skin:     General: Skin is warm and dry.   Neurological:      Mental Status: She is alert and oriented to person, place, and time.   Psychiatric:         Mood and Affect: Mood normal.         Behavior: Behavior normal.         Thought Content: Thought content normal.         Judgment: Judgment normal.         Assessment/Plan   Diagnoses and all orders for this visit:    1. Arthralgia, unspecified joint (Primary)  -     CBC w AUTO Differential  -     Comprehensive metabolic panel  -     T4  -     TSH  -     T3, free  -     Thyroid Antibodies  -     BURKE  -     Sedimentation rate, automated  -     C-reactive protein  -     Rheumatoid Factor  -     Magnesium  -     Lupus Anticoagulant  -     Naproxen-Esomeprazole 500-20 MG tablet delayed-release; Take 1 tablet by mouth 2 (Two) Times a Day As Needed (pain).  Dispense: 60 tablet; Refill: 1    2. Vitamin B12 deficiency  -     Vitamin B12  -     Folate  -     Methylmalonic Acid, Serum    3. Vitamin D deficiency  -     Vitamin D 25 hydroxy    4. Pins and needles sensation  -     CBC w AUTO Differential  -     Comprehensive metabolic panel  -     T4  -     TSH  -     T3, free  -     Thyroid Antibodies  -     BURKE  -     Iron Profile  -     Magnesium  -     Vitamin B12  -     Folate  -     Methylmalonic Acid, Serum      Check labs as outlined in plan. Trial of vimovo to help with pain. Hold nexium while taking   F/u pending labs

## 2021-09-22 LAB
25(OH)D3+25(OH)D2 SERPL-MCNC: 22 NG/ML (ref 30–100)
ALBUMIN SERPL-MCNC: 4.4 G/DL (ref 3.8–4.8)
ALBUMIN/GLOB SERPL: 1.6 {RATIO} (ref 1.2–2.2)
ALP SERPL-CCNC: 105 IU/L (ref 44–121)
ALT SERPL-CCNC: 17 IU/L (ref 0–32)
ANA SER QL: NEGATIVE
APTT SCREEN TO CONFIRM RATIO: 1.1 RATIO (ref 0–1.4)
AST SERPL-CCNC: 13 IU/L (ref 0–40)
BASOPHILS # BLD AUTO: 0 X10E3/UL (ref 0–0.2)
BASOPHILS NFR BLD AUTO: 0 %
BILIRUB SERPL-MCNC: <0.2 MG/DL (ref 0–1.2)
BUN SERPL-MCNC: 15 MG/DL (ref 6–24)
BUN/CREAT SERPL: 16 (ref 9–23)
CALCIUM SERPL-MCNC: 9.4 MG/DL (ref 8.7–10.2)
CHLORIDE SERPL-SCNC: 101 MMOL/L (ref 96–106)
CO2 SERPL-SCNC: 23 MMOL/L (ref 20–29)
CONFIRM APTT/NORMAL: 32.9 SEC (ref 0–55)
CREAT SERPL-MCNC: 0.91 MG/DL (ref 0.57–1)
CRP SERPL-MCNC: 11 MG/L (ref 0–10)
EOSINOPHIL # BLD AUTO: 0.1 X10E3/UL (ref 0–0.4)
EOSINOPHIL NFR BLD AUTO: 1 %
ERYTHROCYTE [DISTWIDTH] IN BLOOD BY AUTOMATED COUNT: 13 % (ref 11.7–15.4)
ERYTHROCYTE [SEDIMENTATION RATE] IN BLOOD BY WESTERGREN METHOD: 37 MM/HR (ref 0–32)
FOLATE SERPL-MCNC: >20 NG/ML
GLOBULIN SER CALC-MCNC: 2.8 G/DL (ref 1.5–4.5)
GLUCOSE SERPL-MCNC: 90 MG/DL (ref 65–99)
HCT VFR BLD AUTO: 45.8 % (ref 34–46.6)
HGB BLD-MCNC: 14.9 G/DL (ref 11.1–15.9)
IMM GRANULOCYTES # BLD AUTO: 0 X10E3/UL (ref 0–0.1)
IMM GRANULOCYTES NFR BLD AUTO: 0 %
IRON SATN MFR SERPL: 25 % (ref 15–55)
IRON SERPL-MCNC: 100 UG/DL (ref 27–159)
LA 2 SCREEN W REFLEX-IMP: NORMAL
LYMPHOCYTES # BLD AUTO: 3.4 X10E3/UL (ref 0.7–3.1)
LYMPHOCYTES NFR BLD AUTO: 38 %
Lab: NORMAL
MAGNESIUM SERPL-MCNC: 2.3 MG/DL (ref 1.6–2.3)
MCH RBC QN AUTO: 28.9 PG (ref 26.6–33)
MCHC RBC AUTO-ENTMCNC: 32.5 G/DL (ref 31.5–35.7)
MCV RBC AUTO: 89 FL (ref 79–97)
METHYLMALONATE SERPL-SCNC: 217 NMOL/L (ref 0–378)
MONOCYTES # BLD AUTO: 0.5 X10E3/UL (ref 0.1–0.9)
MONOCYTES NFR BLD AUTO: 6 %
NEUTROPHILS # BLD AUTO: 4.9 X10E3/UL (ref 1.4–7)
NEUTROPHILS NFR BLD AUTO: 55 %
PLATELET # BLD AUTO: 255 X10E3/UL (ref 150–450)
POTASSIUM SERPL-SCNC: 4.5 MMOL/L (ref 3.5–5.2)
PROT SERPL-MCNC: 7.2 G/DL (ref 6–8.5)
RBC # BLD AUTO: 5.15 X10E6/UL (ref 3.77–5.28)
RHEUMATOID FACT SERPL-ACNC: <10 IU/ML (ref 0–13.9)
SCREEN APTT: 33.9 SEC (ref 0–51.9)
SCREEN DRVVT: 34.5 SEC (ref 0–47)
SODIUM SERPL-SCNC: 138 MMOL/L (ref 134–144)
T3FREE SERPL-MCNC: 3.1 PG/ML (ref 2–4.4)
T4 SERPL-MCNC: 5.1 UG/DL (ref 4.5–12)
THROMBIN TIME: 17.5 SEC (ref 0–23)
THYROGLOB AB SERPL-ACNC: <1 IU/ML (ref 0–0.9)
THYROPEROXIDASE AB SERPL-ACNC: <8 IU/ML (ref 0–34)
TIBC SERPL-MCNC: 406 UG/DL (ref 250–450)
TSH SERPL DL<=0.005 MIU/L-ACNC: 1.62 UIU/ML (ref 0.45–4.5)
UIBC SERPL-MCNC: 306 UG/DL (ref 131–425)
VIT B12 SERPL-MCNC: 461 PG/ML (ref 232–1245)
WBC # BLD AUTO: 9 X10E3/UL (ref 3.4–10.8)

## 2021-09-24 ENCOUNTER — TELEPHONE (OUTPATIENT)
Dept: FAMILY MEDICINE CLINIC | Facility: CLINIC | Age: 46
End: 2021-09-24

## 2021-09-28 DIAGNOSIS — M25.50 ARTHRALGIA OF MULTIPLE JOINTS: Primary | ICD-10-CM

## 2021-10-01 DIAGNOSIS — F41.8 DEPRESSION WITH ANXIETY: ICD-10-CM

## 2021-10-04 RX ORDER — VENLAFAXINE HYDROCHLORIDE 150 MG/1
CAPSULE, EXTENDED RELEASE ORAL
Qty: 30 CAPSULE | Refills: 2 | Status: SHIPPED | OUTPATIENT
Start: 2021-10-04 | End: 2022-05-31

## 2021-10-16 DIAGNOSIS — R23.2 HOT FLASHES: ICD-10-CM

## 2021-10-18 ENCOUNTER — E-VISIT (OUTPATIENT)
Dept: FAMILY MEDICINE CLINIC | Facility: TELEHEALTH | Age: 46
End: 2021-10-18

## 2021-10-18 PROCEDURE — BRIGHTMDVISIT: Performed by: NURSE PRACTITIONER

## 2021-10-18 RX ORDER — ESTRADIOL 1 MG/1
TABLET ORAL
Qty: 30 TABLET | Refills: 2 | Status: SHIPPED | OUTPATIENT
Start: 2021-10-18 | End: 2022-01-20

## 2021-10-18 NOTE — EXTERNAL PATIENT INSTRUCTIONS
View Doctor's Note     Note   May Take vitamins C, D and zinc If symptoms worsen or persist we are hours a day 7 days week if you need to create a another visit or feel you need COVID-19 testing. Hope you are feeling better soon   Diagnosis   Common cold   My name is Richelle Arora, and I'm a healthcare provider at [facility]. I carefully reviewed the symptoms you reported in your interview, and I see that you may have a cold.   With the ongoing COVID-19 pandemic, it can be hard to tell the difference between the common cold and a COVID-19 infection. Many cold symptoms are the same as COVID-19 symptoms. Most people with either illness have mild to moderate symptoms and can rest at home until they get better.   To prevent the spread of illness to others, I recommend that you stay home and away from other people as much as possible while you're sick.   Here are the main things to know about your current symptoms:    Colds get better on their own.    You can use the medications I recommend here to help ease your symptoms.   Based on what you told me in your interview, I haven't prescribed any antibiotics. Antibiotics fight bacteria, not viruses. They don't help when you have a viral infection like the common cold. Antibiotics could even make you feel worse as they can cause or worsen nausea, diarrhea, and stomach pain. The following factors suggest that a virus is causing your symptoms:    You've had symptoms for less than 10 days. A bacterial infection is suspected when you've had symptoms longer than 10 days without improvement.    You don't have sinus pain.    Your nasal discharge is thin.    Your glands/lymph nodes are swollen or tender to the touch. Swollen lymph nodes are common with viral conditions like yours.   I've given you a doctor's note for 2 days.   Medications   Your pharmacy   [id number] [address] [place] [id number] [phone]     Prescription      Benzonatate (200mg):  Take 1 capsule by mouth 3 times a day as needed for cough. Do not chew or cut these capsules.      Mucus Relief ER oral tablet, extended release (600mg): Take 1 tablet by mouth every 12 hours as needed for cough and congestion.   Non-prescription      Ibuprofen (200mg): Take 2 tablets by mouth every 8 hours as needed for 10 days for any fever, pain, or discomfort associated with your condition. Do not exceed 3200mg in a 24-hour period.   Medications won't cure the cold. However, they may help you feel better while your immune system fights the virus.   About your diagnosis   The common cold is a viral infection of the respiratory tract, which includes the nose, throat, breathing passages, and lungs. These are the key things to know about colds:    There is no vaccine to prevent colds and no cure for a cold.    Some medications can lessen your symptoms.    You can spread a cold to other people.    Over 200 different viruses can cause the common cold. That's why you can get another cold after you've just had one.   Common symptoms include low-grade fever, sneezing, runny nose, congestion, sore throat, and cough. You may also notice fatigue, body aches, difficulty sleeping, or decreased appetite.   What to expect   You should feel better within 5 to 7 days and be back to normal within 14 days.   You can return to your normal activities when ALL of the following are true:    You've been fever-free for more than 24 hours without using fever-reducing medications such as Tylenol    Your other symptoms have improved    It's been at least 10 days since your symptoms first started   When to seek care   Call us at [phone]   with any sudden or unexpected symptoms.    Fever that measures over 103F or continues for more than 3 days.    Your headache worsens.    Swallowing becomes extremely difficult or impossible.    Your hoarse voice or loss of voice lasts longer than 2 weeks.    Sinus pain that lasts for more than 10  days, without improvement.    More than 5 episodes of diarrhea in a day.    More than 5 episodes of vomiting in a day.    Coughing up red or bloody mucus.    Severe shortness of breath.    Severe stomach pain.    Symptoms that get better for a few days, and then suddenly get worse.    Severe chest pain   Other treatment    Rest! Your body needs rest to recover and fight the virus.    Drink plenty of water to stay hydrated.    Try non-prescription saline nasal sprays to help your nasal symptoms.    If your nose or sinuses become very stuffy, try using a Neti Pot to flush them out. Neti Pots are available at any drugstore without a prescription.    Gargle with salt water several times a day to help your throat feel better. Cough drops and throat lozenges may provide extra relief. A teaspoon of honey stirred into warm water or weak tea can help soothe a sore throat and cough.    Avoid smoke and air pollution. Smoke can make infections worse. You mentioned that you smoke every day. I encourage you to quit, even for a week or two. Quitting will help your symptoms improve faster.   Prevention    Avoid close contact with other people when you're sick.    Cover your mouth and nose when you cough or sneeze. Use a tissue or cough into your elbow. Make sure that used tissues go directly into the trash.    Avoid touching your eyes, nose, or mouth while you're sick.    Wash your hands often, especially after coughing, sneezing, or blowing your nose. If soap and water are not available, use an alcohol-based hand .    If you or someone in your home or workplace is sick, disinfect commonly used items. This includes door handles, tables, computers, remotes, and pens.    Coronavirus (COVID-19) information   Common symptoms of COVID-19 include fever, cough, shortness of breath, fatigue, muscle or body aches, headaches, new loss of sense of taste or smell, sore throat, stuffy or runny nose, nausea or vomiting, and diarrhea.  Most people who get COVID-19 have mild symptoms and can rest at home until they get better. Elderly people and those with chronic medical problems may be at risk for more serious complications.   FAQs about the COVID-19 vaccine   There are three authorized COVID-19 vaccines: Steven & Steven's Bernabe Vaccine (J&J/Bernabe), Moderna, and Pfizer-BioNTech (Pfizer). The J&J/Bernabe and Moderna vaccines are approved for use in people aged 18 and older. The Pfizer vaccine is approved for those aged 12 and older. All three are available at no cost.   Which vaccine is the best? Which vaccine should I get?   All three vaccines are highly effective. Even if you get COVID after being vaccinated, all of the vaccines help prevent severe disease, hospitalization, and complications.   Most people should get whichever vaccine is first available to them. However, women younger than 50 years old should consider the rare risk of blood clots with low platelets after vaccination with the J&J/Bernabe vaccine. This risk hasn't been seen with the other two vaccines.   Are the vaccines safe?   Yes. Hundreds of millions of people in the [place] have already safely received COVID-19 vaccines. As part of Phase 3 clinical trials in the [place] and other countries, researchers collected safety and efficacy data for all three vaccines. These clinical trials follow strict standards. Before a vaccine is approved, the manufacturing company must submit data to the Food and Drug Administration (FDA) for review. Tens of thousands of volunteers participated in the clinical trials for the vaccines. The FDA continues to monitor safety data as the vaccines are given to the general population.   Do I need the vaccine if I've already had COVID?   Yes. If you've recently had COVID-19, you can choose to wait 90 days after your illness to get vaccinated. It's uncommon to get reinfected with COVID-19 within 90 days after an infection. If you currently have COVID-19, you  should wait to get vaccinated until you feel better and your isolation period is finished. If you've recently been exposed to COVID-19, you should wait to get the vaccine until after your quarantine period.   How many doses of the vaccine do I need?   J&J/Bernabe: one dose.   Moderna: two doses, spaced 4 weeks apart.   Pfizer vaccine: two doses, spaced 3 weeks apart.   When am I considered fully vaccinated?   J&J/Bernabe: 14 days after you get the shot.   Moderna: 14 days after your second dose.   Pfizer: 14 days after your second dose.   What if I miss the second dose of the Moderna or Pfizer vaccine?   Contact your healthcare provider to discuss your options. While one dose of the vaccine may provide some protection against COVID, you need both doses for maximum protection.   What are the common side effects of the vaccine?   A sore arm, tiredness, headache, and muscle pain may occur within two days of getting the vaccine and last a day or two. For the Moderna or Pfizer vaccines, side effects are more common after the second dose. People over the age of 55 are less likely to have side effects than younger people.   After I'm fully vaccinated, can I still get or spread COVID?   Yes, but your disease should be milder, and your risk of serious illness, hospitalization, and complications will be much lower. And being vaccinated reduces the risk of spreading the disease if you get it.   After I'm fully vaccinated, can I go back to normal?    You should still wear a mask indoors in public if:    It's required by laws, rules, regulations, or local guidance.    You have a weakened immune system.    Your age puts you at increased risk of severe disease.    You have a medical condition that puts you at increased risk of severe disease.    Someone in your household has a weakened immune system, is at increased risk for severe disease, or is unvaccinated.    You're in an area of high transmission.    For travel information,  "see the CDC's latest guidance  .    Even after you're fully vaccinated, you should still:    Get tested and stay away from others if you develop symptoms of COVID-19.    Stay home and away from other private or public settings if you've tested positive for COVID-19 in the previous 10 days.    Continue to follow any applicable laws, rules, and regulations.    If you're exposed to COVID-19 after being fully vaccinated, you should get tested 3 to 5 days after exposure, even if you don't have symptoms. Wear a mask indoors in public for 14 days following exposure, or until you've confirmed your test result is negative. If you test positive for COVID-19, you should isolate at home for 10 days.   I'm fully vaccinated but have heard about a \"third dose\" and \"boosters.\" Do these apply to me?   If you got the J&J/Bernabe vaccine, these don't currently apply to you.   Third dose (for immunocompromised people):   If you have a moderately to severely weakened immune system and have had two doses of the Moderna or Pfizer vaccine, you should get a third dose. This is because your immune system may not have responded well enough to the first two doses.   If any of these situations apply, you have a moderately to severely weakened immune system:    You're getting active cancer treatment for a cancer or tumor of the blood    You've had an organ transplant and are taking medicine to suppress your immune system    You've had a stem cell transplant within the last 2 years    You're taking medicine to suppress your immune system, such as high-dose corticosteroids    You have moderate to severe primary immunodeficiency (such as DiGeorge syndrome, Wiskott-Stamford syndrome)    You have advanced or untreated HIV infection   Booster shot (a \"top-up\" for people whose immunity from vaccination may have lessened over time):   If you got the Moderna vaccine, this doesn't currently apply to you.   If you got the Pfizer vaccine AND it's been at " least 6 months since your second dose, you SHOULD get a booster shot if:    You live in a long-term care facility    You're 65 or older    You're 50 to 64 and have an underlying condition, such as:    Cancer    Chronic kidney disease    Chronic lung disease (COPD, moderate to severe asthma, interstitial lung disease, cystic fibrosis, or pulmonary hypertension)    Dementia or other neurological condition    Diabetes    Down syndrome    Heart condition, including heart failure, coronary artery disease, cardiomyopathies, or hypertension    HIV infection    A weakened immune system    Chronic liver disease    Obesity    Pregnancy    Sickle cell disease or thalassemia    Smoking, current or former    Solid organ or blood stem cell transplant    Stroke or cerebrovascular disease    Substance use disorder   If you got the Pfizer vaccine AND it has been at least 6 months since your second dose, you MAY need a booster shot if:    You're 18 to 64 and your job or living situation puts you at increased risk of COVID-19 exposure and transmission    You're 18 to 49 and have an underlying condition, such as:    Cancer    Chronic kidney disease    Chronic lung disease (COPD, moderate to severe asthma, interstitial lung disease, cystic fibrosis, or pulmonary hypertension)    Dementia or other neurological condition    Diabetes    Down syndrome    Heart condition, including heart failure, coronary artery disease, cardiomyopathies, or hypertension    HIV infection    A weakened immune system    Chronic liver disease    Obesity    Pregnancy    Sickle cell disease or thalassemia    Smoking, current or former    Solid organ or blood stem cell transplant    Stroke or cerebrovascular disease    Substance use disorder   If you think you need a booster shot, speak with your care team.   General information about COVID-19   What should I do if I'm exposed to someone with COVID-19?   In general, you need to be in close contact with someone who  has COVID-19 to get infected. Close contact means:    Living in the same household as someone with COVID-19.    Caring for someone with COVID-19.    Being within 6 feet of someone with COVID-19 for a total of at least 15 minutes over a 24-hour period.    Being in direct contact with respiratory droplets from someone with COVID-19 (for example, being coughed on, kissing, or sharing utensils).   If you're exposed and not fully vaccinated:    Self-quarantine in your home for 14 days after your last known contact with the infected person. Because you can spread the disease before you have symptoms, it's very important that you stay home AT ALL TIMES, unless you need medical care. Don't go to work, school, or public places, including grocery stores and pharmacies. Avoid public transportation, ride-sharing, and taxis.    Watch for the common symptoms of COVID-19: fever, cough, shortness of breath, fatigue, muscle or body aches, headache, new loss of sense of taste or smell, sore throat, stuffy or runny nose, nausea or vomiting, and diarrhea.   What if I develop symptoms of COVID-19?   CALL your healthcare provider or clinic right away to discuss next steps if you have any of the following risk factors:    Age 65 or older    Pregnant    Chronic medical condition such as diabetes, liver disease, kidney disease requiring dialysis, heart disease, high blood pressure, severe obesity, or lung disease (including moderate to severe asthma)    A medical condition that affects your immune system    Taking a medication that affects your immune system   Otherwise, if your symptoms are mild, you don't need to call your healthcare provider or be seen for an exam. You can recover at home and should feel better within a few weeks. Because COVID-19 is highly contagious, it's important that you avoid close contact with others while you're recovering. This means staying home AT ALL TIMES, unless you need medical care. Don't go to work,  school, or public places, including grocery stores and pharmacies. Avoid public transportation, ride-sharing, and taxis.   There are currently no specific medications to treat this infection. Over-the-counter cold medications can help ease symptoms.   To prevent the spread of COVID-19 to the people and animals in your household:    Stay in a specific room away from other people in your home, and use a separate bathroom if possible.    Wear a mask when close contact with household members can't be avoided.   You can return to your normal activities when ALL of the following are true:    Your symptoms have improved    It's been at least 10 days since your symptoms first started    You've been fever-free for more than 24 hours without using fever-reducing medications such as Tylenol   When to get care   Call your healthcare provider immediately if you have any of the following:    Fever over 103F    Fever that doesn't come down after taking medications such as Tylenol or ibuprofen    Fever that returns after being gone for more than 24 hours    Fever lasting more than 4 days    Worsening shortness of breath or difficulty breathing   Go to your nearest ER or call 911 if you have any of the following:    Shortness of breath that makes it hard to do simple things like get dressed, bathe, or comb your hair    Persistent chest pain or chest tightness    New confusion or difficulty staying alert    Bluish color to the lips or face    Flu vaccine information   Getting a flu vaccine this year is more important than ever. The vaccine not only protects you and the people around you from the flu, it also helps reduce the strain on healthcare systems responding to the COVID-19 pandemic.   Flu shots usually become available in early September.   Who should get a flu vaccine?   Everyone 6 months of age and older should get a yearly flu vaccine.   When should I get vaccinated?   You should get a flu vaccine by the end of October.  Once you're vaccinated, it takes about two weeks for antibodies to develop and protect you against the flu. That's why it's important to get vaccinated as soon as possible.   After October, is it too late to get vaccinated?   No. You should still get vaccinated. As long as the flu viruses are still in your community, flu vaccines will remain available, even into January of next year or later.   Why do I need a flu vaccine EVERY year?   Flu viruses are constantly changing, so flu vaccines are usually updated from one season to the next. Your protection from the flu vaccine also lessens over time.   Is the flu vaccine safe?   Yes. Over the last 50 years, hundreds of millions of Americans have safely received the flu vaccines.   What are the side effects of flu vaccines?   You CANNOT get the flu from a flu vaccine. Common side effects of the flu shot include soreness, redness and/or swelling where the shot was given, low grade fever, and aches. Common side effects of the nasal spray flu vaccine for adults include runny nose, headaches, sore throat, and cough. For children, side effects include wheezing, vomiting, muscle aches, and fever.   Does the flu vaccine increase your risk of getting COVID-19?   No. There is no evidence that getting a flu vaccine increases your risk of getting COVID-19.   Is it safe to get the flu vaccine along with a COVID-19 vaccine?   Yes. It's safe to get the flu vaccine with a COVID vaccine or booster.   Contact your healthcare provider TODAY for details on when and where to get your flu vaccine.   Your provider   Your diagnosis was provided by Richelle Arora, a member of your trusted care team at [facility].   If you have any questions, call us at [phone]  .   View Doctor's Note     Expires on 11/17/21

## 2021-10-18 NOTE — E-VISIT TREATED
Chief Complaint: Coronavirus (COVID-19), cold, sinus pain, allergy, or flu   Patient introduction   Patient is 46-year-old female who reports cough, rhinitis, voice hoarseness, headache, and fatigue that started 3-5 days ago.   Patient has not requested COVID testing.   Previous history of COVID-19 testing: Patient had an antibody test > 3 months ago. Test result is unknown.   Coronavirus Disease 2019 (COVID-19) exposure:    No known exposure to a confirmed or suspected case of COVID-19    No recent travel outside of their local community   Reports receiving 2 doses of Moderna vaccine. Received their most recent dose of the vaccine > 14 days ago.   Warning. The following may warrant further investigation:     Hypertension    BMI >= 40    Dizziness that makes it hard to stand, walk, or do daily activities   When asked why they're seeking care online today, patient reports they just want to feel better.   Patient requests a 2-day excuse note.   General presentation   Symptoms came on gradually.   Fever:    Denies fever.   Sinus and nasal symptoms:    Reports rhinitis.    Reports yellow nasal drainage.    Nasal drainage is thin.    Reports postnasal drip.    Denies nasal or sinus congestion.    Denies itchy nose or sneezing.   Sore throat:    Reports mild hoarseness. Patient doesn't believe hoarseness is due to voice strain.    Denies sore throat.   Head and body aches:    Reports headache, described as moderate (4-6 on a scale of 1-10).    Reports fatigue.    Denies sweats.    Denies chills.    Denies myalgia.   Dizziness:    Reports dizziness that interferes with daily activities.   Cough:    Reports cough.    Cough is worse during the day and at night/while sleeping.    Cough is mildly productive of sputum.    Describes color of mucus as white/frothy.   Wheezing and SOB:    Reports previous albuterol inhaler use during URIs, bronchitis, or pneumonia.    Reports previous steroid inhaler use during URIs,  bronchitis, or pneumonia.    Not using an albuterol inhaler for current symptoms because they don't have any available.    Denies COPD diagnosis.    Denies asthma diagnosis.    Denies shortness of breath.   Chest pain:    Denies chest pain.   Allergies:    Reports history of allergies.    Patient does not think symptoms are allergy-related.    Patient has known seasonal allergies and known dust allergies.   Flu exposure:    Denies recent exposure to confirmed flu diagnosis.    Denies receiving a flu vaccine this season.   Patient denies the following red flags:    Changes in alertness or awareness    Symptoms suggesting intracranial hemorrhage    Decreased urination   Risk factors for antibiotic resistance:    Daily smoker   Pregnancy/menstrual status/breastfeeding:    Patient is postmenopausal   Self-exam:    No difficulty moving their chin toward their chest    Swollen and tender neck lymph nodes   Denies antibiotic treatment for similar symptoms within the past month.   Current medications   Reports taking over-the-counter medication for current symptoms. Patient has taken acetaminophen, dextromethorphan, diphenhydramine, fluticasone, and guaifenesin.   Denies taking other medications or supplements.   Medication contraindication review   Reports history positive for depression and hypertension. Therefore, the following medication(s) will not be prescribed:    Metoclopramide    Acetaminophen-diphenhydramine-phenylephrine    Aspirin-chlorpheniramine-phenylephrine   Denies history of metoclopramide-associated dystonic reaction and tardive dyskinesia.   No known history of amoxicillin-clavulanate-associated cholestatic jaundice or hepatic impairment.   No known history of azithromycin-associated cholestatic jaundice or hepatic impairment.   Past medical history   Immune conditions: Denies immunocompromising conditions. Denies history of cancer.   Social history   High-risk household contacts: Patient's household  includes one or more members of a group with risk factors for influenza complications, including a person >= 65 years. Patient's household includes one or more persons with the following: chronic lung disease, diabetes, and kidney disorders.   Daily smoker.   Assessment   Acute nasopharyngitis (common cold). Ruled out: Traumatic laryngitis.   [facility] required information for COVID-19 lab data reporting (if test is ordered):   Symptoms:    Cough    Fatigue    Headache    Rhinitis   Symptom onset: 3-5 days ago ago    Healthcare worker? No  Resident in a congregate care setting? No  Previous history of COVID-19 testing: Patient had an antibody test > 3 months ago. Test result is unknown.     Plan   Medications:    benzonatate 200 mg capsule RX 200mg 1 cap PO tid PRN 7d for cough. Do not chew or cut these capsules. Amount is 21 cap.    Mucus Relief  mg tablet, extended release RX 600mg 1 tab PO q12h PRN 7d for cough and congestion. Amount is 14 tab.    ibuprofen 200 mg tablet OTC 200mg 2 tabs PO q8h PRN 10d for any fever, pain, or discomfort associated with your condition. Do not exceed 3200mg in a 24-hour period. Amount is 60 tab.   The patient's prescriptions will be sent to:   [facility]/pharmacy #[id number]   [address] [place] [address]   Phone: [phone]     Fax: [phone]   Patient informed to purchase OTC medication.   Other:   Patient was given an excuse note for 2 days.   Education:    Condition and causes    Prevention    Treatment and self-care    When to call provider      ----------   Electronically signed by ALINA Smith on 2021-10-18 at 08:55AM   ----------   Patient Interview Transcript:   Why are you getting care through eVisit today? We can't guarantee a specific treatment or test. Your provider will decide what's best for you. Select all that apply.    I just want to feel better!   Not selected:    I want a specific treatment or medication    I want to know if I have a cold or  something more serious    I want to know if I need to be seen by a provider    I need a doctor's note    I want to be tested for COVID-19    I want to get the COVID-19 vaccine    I think I'm having side effects from the COVID-19 vaccine    None of the above   Which of these symptoms are bothering you? Select all that apply.    Cough    Runny nose    Hoarse voice or loss of voice    Headache    Fatigue or tiredness   Not selected:    Shortness of breath    Fever    Stuffed-up nose or sinuses    Itchy or watery eyes    Itchy nose or sneezing    Loss of smell or taste    Sore throat    Sweats    Chills    Muscle or body aches    Nausea or vomiting    Diarrhea    I don't have any of these symptoms   Before we learn more about why you're here, we'll get some information related to COVID-19. We'll ask about risk factors, testing, vaccination status, and exposure. Do you have any of these conditions? If so, you may be at increased risk for complications from COVID-19. Select all that apply.    None of the above   Not selected:    Chronic lung disease, such as cystic fibrosis or interstitial fibrosis    Heart disease, such as congenital heart disease, congestive heart failure, or coronary artery disease    Disorder of the brain, spinal cord, or nerves and muscles, such as dementia, cerebral palsy, epilepsy, muscular dystrophy, or developmental delay    Metabolic disorder or mitochondrial disease    Cerebrovascular disease, such as stroke or another condition affecting the blood vessels or blood supply to the brain   Do you live in a group care setting? Examples include: - Nursing home - Residential care - Psychiatric treatment facility - [facility] home - [facility] - Board and care home - Homeless shelter - Foster care setting Select one.    No   Not selected:    Yes   Have you ever been tested for COVID-19? Select one.    Yes   Not selected:    No   When was your most recent COVID-19 test? Select one.    More than 3 months  "ago   Not selected:    Within the last week    7 to 14 days ago    15 to 30 days ago    1 to 3 months ago   What type of COVID-19 test did you have? There are 2 types of COVID-19 tests: - Viral tests check if you're currently infected with COVID-19. - Antibody tests check if you've been infected in the past. Select one.    Antibody test for past infection   Not selected:    Viral test for current infection   What was the result of your most recent COVID-19 test? Select one.    I'm not sure   Not selected:    Positive (signs of current or past infection)    Negative (no sign of infection)   Have you gotten the COVID-19 vaccine? Select one.    Yes   Not selected:    No   Which COVID-19 vaccine did you get? Check your Vaccination Record Card under Product Name/. Select one.    Moderna   Not selected:    [facility]'s Bernabe Vaccine (J&J/Bernabe)    Pfizer-BioNTech (Pfizer)   How many doses of the COVID-19 vaccine have you gotten? Select one.    2 doses   Not selected:    1 dose   When did you get your most recent dose of the COVID-19 vaccine?    More than 14 days ago   Not selected:    Less than 48 hours (2 days) ago    48 to 72 hours (3 days) ago    3 to 5 days ago    5 to 7 days ago    7 to 14 days ago   In the last 14 days, have you traveled outside of your local community? This includes travel by car, RV, bus, train, or plane. Travel increases your chances of getting and spreading COVID-19. Select one.    No   Not selected:    Yes   In the last 14 days, have you had close contact with someone who has coronavirus (COVID-19)? \"Close contact\" means any of these: - Living in the same household as someone with COVID-19. - Caring for someone with COVID-19. - Being within 6 feet of someone with COVID-19 for a total of at least 15 minutes over a 24-hour period. For example, three 5-minute exposures for a total of 15 minutes. - Being in direct contact with respiratory droplets from someone with COVID-19 " (being coughed on, kissing, sharing utensils). Select one.    No, not that I know of   Not selected:    Yes, a confirmed case    Yes, a suspected case   Thanks for completing our COVID-19 questions. Now we'll return to your symptoms. When did your symptoms start? If you know the exact date your symptoms started, choose Other and enter the month and day. Select one.    3 to 5 days ago   Not selected:    Less than 48 hours ago    6 to 9 days ago    10 to 14 days ago    2 to 4 weeks ago    More than a month ago    Other (specify)   Did your symptoms come on suddenly or gradually? Select one.    Gradually   Not selected:    Suddenly    I'm not sure   You mentioned having a headache. On a scale of 1 to 10, how severe is your headache pain? Select one.    Moderate (4 to 6)   Not selected:    Mild (1 to 3)    Severe (7 to 9)    Unbearable (10)    The worst headache of my life (10+)   Do you cough so hard that it's made you gag or vomit? By gag, we mean has your coughing made you choke or dry heave? Select all that apply.    No   Not selected:    Yes, my coughing has made me gag    Yes, my coughing has made me vomit   When is your cough the worst? Select all that apply.    During the day    At nighttime, or while I'm sleeping   Not selected:    In the morning, or when I wake up    I'm not sure   Are you coughing up mucus or phlegm? Select one.    Yes, a little   Not selected:    No, my cough is dry    Yes, a lot   What color is most of the mucus or phlegm that you're coughing up? Select one.    White/frothy   Not selected:    Clear    Yellow    Green    Red or pink    I'm not sure   What color is your nasal drainage? Select one.    Yellow   Not selected:    Clear    White    Green    My nose is stuffed but not draining or running    I'm not sure   Is your nasal drainage thick or thin? Select one.    Thin   Not selected:    Thick    I'm not sure   Is there any drainage (mucus) going down the back of your throat? This kind of  "drainage is also called \"postnasal drip.\" Select one.    Yes   Not selected:    No    I'm not sure   Since your symptoms started, have you felt dizzy? Select one.    Yes, and it makes it hard to stand, walk, or do daily activities   Not selected:    Yes, but I can continue with my regular daily activities    No   Do you have chest pain? You might also feel it as discomfort, aching, tightness, or squeezing in the chest. Select one.    No   Not selected:    Yes   Have you urinated at least 3 times in the last 24 hours? Select one.    Yes   Not selected:    No    I'm not sure   Changes in alertness or awareness may mean you need emergency care. Since your symptoms started, have you had any of these? Select all that apply.    None of the above   Not selected:    Confusion    Slurred speech    Not knowing where you are or what day it is    Difficulty staying conscious    Fainting or passing out   Do your symptoms include a whistling sound, or wheezing, when you breathe? Select one.    I'm not sure   Not selected:    Yes    No   Early in this interview, you told us you were hoarse or you'd lost your voice. How would you describe the changes to your voice? Select one.    It just sounds a little raspy   Not selected:    It's harder than usual to talk    I can barely talk at all   Is it possible that you strained your voice? Singing, yelling, or talking more or louder than usual can cause voice strain. Select one.    No   Not selected:    Yes    I'm not sure   Do you have any of these symptoms in your ear(s)? Select all that apply.    Pain    Pressure    Plugged or blocked sensation   Not selected:    Fullness    Crackling or popping    None of the above   Can you move your chin toward your chest?    Yes   Not selected:    No, my neck is too stiff   Are your glands/lymph nodes swollen, or does it hurt when you touch them?    Yes   Not selected:    No    I'm not sure   People with a very high body mass index (BMI) are at " higher risk for developing complications from the flu and severe illness from COVID-19. To determine your BMI, we need to know your weight and height. Please enter your weight (in pounds).    Weight   Please enter your height.    Height   In the past week, has anyone around you (such as at school, work, or home) had a confirmed diagnosis of the flu? A confirmed diagnosis means that a nose swab was done to verify a flu infection. Select all that apply.    No   Not selected:    I live with someone who has the flu    I've been within touching distance of someone who has the flu    I've walked by, or sat about 3 feet away from, someone who has the flu    I've been in the same building as someone who has the flu    I'm not sure   Have you ever been diagnosed with asthma? Select one.    No   Not selected:    Yes   Have you ever been prescribed albuterol to use for wheezing, cough, or shortness of breath caused by a cold, bronchitis, or pneumonia? Albuterol (ProAir, Proventil, Ventolin) is prescribed as an inhaler or a solution to be used with a nebulizer machine. Select one.    Yes   Not selected:    No    I'm not sure   Have you ever been prescribed a steroid inhaler to use for wheezing, cough, or shortness of breath caused by a cold, bronchitis, or pneumonia? Some examples of steroid inhalers include Pulmicort, Flovent, Qvar, and Alvesco. Select one.    Yes   Not selected:    No    I'm not sure   Have you used albuterol for your current symptoms? This includes both albuterol inhalers and albuterol solution in a nebulizer machine. Select one.    No, I don't have any, or it's    Not selected:    Yes    No, I don't feel like I need it   Would you like a prescription for albuterol? Select one.    No   Not selected:    Yes   Have you ever been diagnosed with chronic obstructive pulmonary disease (COPD)? Select one.    No   Not selected:    Yes    I'm not sure   Do you have allergies (pollen, dust mites, mold, animal  dander)? Select one.    Yes   Not selected:    No    I'm not sure   What kind of allergies do you have? Select all that apply.    Seasonal allergies (hay fever)    Dust allergies   Not selected:    Pet allergies    None of the above    I'm not sure   Do you think your symptoms could be allergy-related? Select one.    No   Not selected:    Yes    I'm not sure   Have you had a flu shot this season? Select one.    No   Not selected:    Yes, less than 2 weeks ago    Yes, 2 to 4 weeks ago    Yes, 1 to 3 months ago    Yes, 3 to 6 months ago    Yes, more than 6 months ago    I'm not sure   The flu and COVID-19 can be more serious for people with certain conditions or characteristics. These questions help us figure out if you or anyone you live with is at higher risk for complications from these infections. Do either of these statements apply to you? Select all that apply.    None of the above   Not selected:    I'm  or Native [place]    I'm a healthcare worker   Do you smoke tobacco? Select one.    Yes, every day   Not selected:    Yes, some days    No, I quit    No, never   Some conditions can put you at risk for more serious infections. Do any of these apply to you? Select all that apply.    None of the above   Not selected:    I've been hospitalized within the last 5 days    I have diabetes    I'm in close contact with a child in    Are you currently being treated for any of these conditions? Scroll to see all options. Select all that apply.    Depression    High blood pressure   Not selected:    Aspirin triad (also known as Samter's triad or ASA triad)    Asthma or hives from taking aspirin or other NSAIDs, such as ibuprofen or naproxen    Blockage or narrowing of the blood vessels of the heart    Blood dyscrasia, such anemia, leukemia, lymphoma, or myeloma    Bone marrow depression    Catecholamine-releasing paraganglioma    Blood clotting disorder    Congenital long QT syndrome    Difficulty  urinating or completely emptying your bladder    Uncorrected electrolyte abnormalities    Fungal infection    Gastrointestinal (GI) bleeding    Gastrointestinal (GI) obstruction    G6PD deficiency    Recent heart attack    Irregular heartbeat or heart rhythm    Kidney disease or hemodialysis    Mononucleosis (mono)    Myasthenia gravis    Parkinson's disease    Pheochromocytoma    Reye syndrome    Seizure disorder    Ulcerative colitis    None of the above   Do you have any of these conditions that can affect the immune system? Scroll to see all options. Select all that apply.    None of these   Not selected:    History of bone marrow transplant    Chronic kidney disease    Chronic liver disease (including cirrhosis)    HIV/AIDS    Inflammatory bowel disease (Crohn's disease or ulcerative colitis)    Lupus    Moderate to severe plaque psoriasis    Multiple sclerosis    Rheumatoid arthritis    Sickle cell anemia    Alpha or beta thalassemia    History of solid organ transplant (kidney, liver, or heart)    History of spleen removal    An autoimmune disorder not listed here    A condition requiring treatment with long-term use of oral steroids (such as prednisone, prednisolone, or dexamethasone)   Have you ever been diagnosed with cancer? Select one.    No   Not selected:    Yes, I have cancer now    Yes, but I'm in remission   Have you ever had either of these conditions? Select all that apply.    No   Not selected:    Metoclopramide-associated dystonic reaction    Tardive dyskinesia   Do any of these apply to the people who live with you? Select all that apply.    An adult 65 or older   Not selected:    A child under the age of 5    A person who is pregnant    A person who has given birth, had a miscarriage, had a pregnancy loss, or had an  in the last 2 weeks    An  or Native [place]    None of the above   Does any member of your household have any of these medical conditions? Select all that  apply.    Chronic lung disease, such as COPD or cystic fibrosis    Diabetes    Kidney disorders   Not selected:    Asthma    Disorders of the brain, spinal cord, or nerves and muscles, such as dementia, cerebral palsy, epilepsy, muscular dystrophy, or developmental delay    Heart disease, such as congenital heart disease, congestive heart failure, or coronary artery disease    Cerebrovascular disease, such as stroke or another condition affecting the blood vessels or blood supply to the brain    Blood disorders, such as sickle cell disease    Metabolic disorders such as inherited metabolic disorders or mitochondrial disease    Liver disorders    Weakened immune system due to illness or medications such as chemotherapy or steroids    Children under the age of 19 who are on long-term aspirin therapy    Extreme obesity (BMI > 40)    None of the above   Have you gone through menopause? Select one.    Yes   Not selected:    No   Just a few more questions about medications, and then you're finished. Have you used any non-prescription medications or nasal sprays for your current symptoms? Examples include saline sprays, decongestants, NyQuil, and Tylenol. Select one.    Yes   Not selected:    No   Which of these non-prescription medications have you tried? Scroll to see all options. Select all that apply.    Acetaminophen (Tylenol)    Dextromethorphan (Delsym, Robitussin, Vicks DayQuil Cough)    Diphenhydramine (Benadryl)    Fluticasone (Flonase)    Guaifenesin (Mucinex)   Not selected:    Budesonide (Rhinocort)    Cetirizine (Zyrtec)    Chlorpheniramine (Aller-chlor, Chlor-Trimeton)    Cromolyn (NasalCrom)    Fexofenadine (Allegra)    Guaifenesin/dextromethorphan (Delsym DM, Mucinex DM, Robitussin DM)    Ibuprofen (Advil, Motrin, Midol)    Ketotifen (Alaway, Zaditor)    Loratadine (Alavert, Claritin)    Naphazoline-pheniramine (Naphcon-A, Opcon-A, Visine-A)    Omeprazole (Prilosec)    Oxymetazoline (Afrin)    " Phenylephrine (Sudafed)    Triamcinolone (Nasacort)    None of the above   In the past month, have you taken antibiotics for similar symptoms? Examples of antibiotics include amoxicillin, amoxicillin-clavulanate (Augmentin), penicillin, cefdinir (Omnicef), doxycycline, and clindamycin (Cleocin). Select one.    No   Not selected:    Yes    I'm not sure   Have you taken any monoamine oxidase inhibitor (MAOI) medications in the last 14 days? Examples include rasagiline (Azilect), selegiline (Eldepryl, Zelapar), isocarboxazid (Marplan), phenelzine (Nardil), and tranylcypromine (Parnate). Select one.    No   Not selected:    Yes    I'm not sure   Do you take Kynmobi or Apokyn (apomorphine)? Select one.    No   Not selected:    Yes    I'm not sure   Are you taking any other medications or supplements? On the next screen, you need to list all vitamins, supplements, non-prescription medications (such as aspirin or Aleve), and prescription medications that you're taking. Select one.    No   Not selected:    Yes    Yes, but I'm not sure what they are   Have you ever had an allergic or bad reaction to any medication? Select one.    Yes   Not selected:    No   Have you had an allergic or bad reaction to any of these medications? Select all that apply.    None of the above   Not selected:    Baloxavir (Xofluza)    Benzonatate (Tessalon Perles)    Fluconazole, itraconazole, or terconazole (brands include Diflucan, Sporanox, Terazol)    Oseltamivir (Tamiflu) or zanamivir (Relenza)    I'm not sure   Have you had an allergic or bad reaction to any of these antibiotic medications? Select all that apply.    None of the above   Not selected:    Penicillin or any \"-cillin\" antibiotic, such as amoxicillin, ampicillin, dicloxacillin, nafcillin, or piperacillin (Brands include Augmentin, Unasyn, and Zosyn)    Tetracycline or any \"-cycline\" antibiotic, such as doxycycline, demeclocycline, minocycline (Brands include Declomycin, Doryx, " "Dynacin, Oracea, Monodox, Panmycin, and Vibramycin)    Ciprofloxacin or any \"-floxacin\" antibiotic, such as gemifloxacin, levofloxacin, moxifloxacin, or ofloxacin (Brands include Factive, Cipro, Floxin, and Levaquin)    Cephalexin or any \"cef-\" antibiotic, such as cefazolin, cefdinir, cefuroxime, ceftriaxone, ceftazidime, or cefepime (Brands include Ancef, Ceftin, Fortaz, Keflex, Maxipime, Rocephin, and Simplicef)    Azithromycin or any \"-thromycin\" antibiotic, such as erythromycin or clarithromycin (Brands include Biaxin, Erythrocin, Z-willard, and Zithromax)    Clindamycin or lincomycin (Brands include Cleocin and Lincocin)    I'm not sure   Have you had an allergic or bad reaction to any of these medications? Select all that apply.    None of the above   Not selected:    Albuterol or a similar medication    Corticosteroid (steroid) medication, including topical steroids, inhaled steroids, nasal steroids, or oral steroids (budesonide, ciclesonide, dexamethasone, flunisolide, fluticasone, methylprednisolone, triamcinolone, prednisone (or brand names Alvesco, Deltasone, Flovent, Medrol, Nasacort, Rhinocort, or Veramyst)    Metoclopramide (Reglan)    Ondansetron (Zuplenz, Zofran ODT, Zofran)    Prochlorperazine (Compazine)    I'm not sure   Have you had an allergic or bad reaction to any of these eye drops or nasal sprays? Scroll to see all options. Select all that apply.    None of the above   Not selected:    Azelastine (Astelin, Astepro, Optivar)    Cromolyn (Crolom, NasalCrom)    Ipratropium (Atrovent)    Ketotifen (Alaway, Zaditor)    Pheniramine/naphazoline (Naphcon-A, Opcon-A, Visine-A)    Olopatadine (Pataday, Patanol, Pazeo)    I'm not sure   Have you had an allergic or bad reaction to any of these non-prescription medications? Scroll to see all options. Select all that apply.    None of the above   Not selected:    Acetaminophen (Tylenol)    Aspirin    Cetirizine (Zyrtec)    Chlorpheniramine (Aller-chlor, " Chlor-Trimeton)    Dextromethorphan (Delsym, Robitussin, Vicks DayQuil Cough)    Diphenhydramine (Benadryl)    Fexofenadine (Allegra)    Guaifenesin (Mucinex)    Guaifenesin/dextromethorphan (Delsym DM, Mucinex DM, Robitussin DM)    Ibuprofen (Advil, Motrin, Midol)    Loratadine (Alavert, Claritin)    Oxymetazoline (Afrin)    Phenylephrine (Sudafed)    I'm not sure   Are you allergic to milk or to the proteins found in milk (for example, whey or casein)? A milk allergy is different from lactose intolerance. Select one.    No   Not selected:    Yes    I'm not sure   Have you ever had jaundice or liver problems as a result of taking amoxicillin-clavulanate (Augmentin)? Jaundice is a condition in which the skin and the whites of the eyes turn yellow. Select all that apply.    No, not that I know of   Not selected:    Yes, jaundice    Yes, liver problems   Have you ever had jaundice or liver problems as a result of taking azithromycin (Zithromax, Zmax)? Jaundice is a condition in which the skin and the whites of the eyes turn yellow. Select all that apply.    No, not that I know of   Not selected:    Yes, jaundice    Yes, liver problems   Do you need a doctor's note? A doctor's note confirms that you received care today and states when you can return to school or work. It does not contain information about your diagnosis or treatment plan. Your provider will make the final decision on whether to give you a doctor's note and for how long. Doctor's notes CANNOT be backdated. Select one.    Today and tomorrow (2 days)   Not selected:    Today only (1 day)    3 days    7 days    10 days    14 days    No   Is there anything else you'd like to tell us about your symptoms?   The patient did not enter any additional information.   ----------   Medical history   The following information was received from the EMR on October 18, 2021.   Allergies:    SULFA ANTIBIOTICS   - Allergy Type: medication   - Reaction: Anaphylaxis    - Severity: Severe   - Status: active   Medications:    ONDANSETRON 4 MG PO TBDP   - Route: Translingual   - Start Date: February 24, 2021   - End Date: None   - Status: active    NAPROXEN-ESOMEPRAZOLE 500-20 MG PO TBEC   - Route: Oral   - Start Date: September 16, 2021   - End Date: None   - Status: active    ATENOLOL 100 MG PO TABS   - Route: Oral   - Start Date: June 21, 2021   - End Date: None   - Status: active    POTASSIUM CHLORIDE ER 8 MEQ PO TBCR   - Route: Oral   - Start Date: September 21, 2020   - End Date: None   - Status: active    LOSARTAN POTASSIUM 100 MG PO TABS   - Route:   - Start Date: September 16, 2021   - End Date: None   - Status: active    METRONIDAZOLE 0.75 % EX GEL   - Route:   - Start Date: October 22, 2020   - End Date: None   - Status: active    SUCRALFATE 1 G PO TABS   - Route: Oral   - Start Date: April 12, 2021   - End Date: None   - Status: active    ESTRADIOL 1 MG PO TABS   - Route:   - Start Date: October 18, 2021   - End Date: None   - Status: active    VENLAFAXINE HCL  MG PO CP24   - Route:   - Start Date: October 04, 2021   - End Date: None   - Status: active    ESOMEPRAZOLE MAGNESIUM 40 MG PO CPDR   - Route: Oral   - Start Date: June 21, 2021   - End Date: None   - Status: active    FUROSEMIDE 20 MG PO TABS   - Route: Oral   - Start Date: September 21, 2020   - End Date: None   - Status: active    TIZANIDINE HCL 4 MG PO TABS   - Route: Oral   - Start Date: June 21, 2021   - End Date: None   - Status: active   Problem list:    Essential hypertension   - Category: Problem List Item   - Health Status:   - Start Date: September 21, 2020   - End Date: None   - Status: active    Vitamin D deficiency   - Category: Problem List Item   - Health Status:   - Start Date: September 21, 2020   - End Date: None   - Status: active    Depression with anxiety   - Category: Problem List Item   - Health Status:   - Start Date: September 21, 2020   - End Date: None   - Status: active    Acute  pain of right shoulder   - Category: Problem List Item   - Health Status:   - Start Date: September 21, 2020   - End Date: None   - Status: active    Cervical radiculopathy   - Category: Problem List Item   - Health Status:   - Start Date: September 21, 2020   - End Date: None   - Status: active    Nontraumatic incomplete tear of right rotator cuff   - Category: Problem List Item   - Health Status:   - Start Date: October 22, 2020   - End Date: None   - Status: active    Bursitis of right shoulder   - Category: Problem List Item   - Health Status:   - Start Date: October 22, 2020   - End Date: None   - Status: active    Impingement syndrome of right shoulder   - Category: Problem List Item   - Health Status:   - Start Date: October 22, 2020   - End Date: None   - Status: active    Neck pain   - Category: Problem List Item   - Health Status:   - Start Date: October 22, 2020   - End Date: None   - Status: active    Numbness and tingling in right hand   - Category: Problem List Item   - Health Status:   - Start Date: October 22, 2020   - End Date: None   - Status: active

## 2021-11-18 DIAGNOSIS — R60.9 EDEMA, UNSPECIFIED TYPE: Primary | ICD-10-CM

## 2021-11-18 RX ORDER — POTASSIUM CHLORIDE 600 MG/1
8 TABLET, FILM COATED, EXTENDED RELEASE ORAL DAILY
Qty: 90 TABLET | Refills: 1 | Status: SHIPPED | OUTPATIENT
Start: 2021-11-18 | End: 2022-05-19

## 2021-11-18 RX ORDER — FUROSEMIDE 20 MG/1
20 TABLET ORAL AS NEEDED
Qty: 90 TABLET | Refills: 1 | Status: SHIPPED | OUTPATIENT
Start: 2021-11-18 | End: 2022-05-19

## 2021-11-22 DIAGNOSIS — M25.50 ARTHRALGIA, UNSPECIFIED JOINT: ICD-10-CM

## 2021-12-20 RX ORDER — TIZANIDINE 4 MG/1
4 TABLET ORAL EVERY 8 HOURS PRN
Qty: 270 TABLET | Refills: 0 | Status: SHIPPED | OUTPATIENT
Start: 2021-12-20 | End: 2022-03-21

## 2022-01-20 DIAGNOSIS — R23.2 HOT FLASHES: ICD-10-CM

## 2022-01-20 DIAGNOSIS — M25.50 ARTHRALGIA, UNSPECIFIED JOINT: ICD-10-CM

## 2022-01-20 RX ORDER — ESTRADIOL 1 MG/1
TABLET ORAL
Qty: 30 TABLET | Refills: 2 | Status: SHIPPED | OUTPATIENT
Start: 2022-01-20 | End: 2022-05-31 | Stop reason: SDUPTHER

## 2022-02-01 RX ORDER — ATENOLOL 100 MG/1
TABLET ORAL
Qty: 90 TABLET | Refills: 0 | Status: SHIPPED | OUTPATIENT
Start: 2022-02-01 | End: 2022-05-25 | Stop reason: SDUPTHER

## 2022-03-21 RX ORDER — TIZANIDINE 4 MG/1
4 TABLET ORAL EVERY 8 HOURS PRN
Qty: 90 TABLET | Refills: 0 | Status: SHIPPED | OUTPATIENT
Start: 2022-03-21 | End: 2022-05-31 | Stop reason: SDUPTHER

## 2022-03-21 RX ORDER — LOSARTAN POTASSIUM 100 MG/1
100 TABLET ORAL DAILY
Qty: 30 TABLET | Refills: 0 | Status: SHIPPED | OUTPATIENT
Start: 2022-03-21 | End: 2022-05-31 | Stop reason: SDUPTHER

## 2022-04-20 RX ORDER — LOSARTAN POTASSIUM 100 MG/1
100 TABLET ORAL DAILY
Qty: 30 TABLET | Refills: 0 | OUTPATIENT
Start: 2022-04-20

## 2022-04-20 RX ORDER — TIZANIDINE 4 MG/1
TABLET ORAL
Qty: 90 TABLET | Refills: 0 | OUTPATIENT
Start: 2022-04-20

## 2022-05-05 RX ORDER — ATENOLOL 100 MG/1
TABLET ORAL
Qty: 90 TABLET | Refills: 0 | OUTPATIENT
Start: 2022-05-05

## 2022-05-19 DIAGNOSIS — R60.9 EDEMA, UNSPECIFIED TYPE: ICD-10-CM

## 2022-05-19 RX ORDER — FUROSEMIDE 20 MG/1
20 TABLET ORAL AS NEEDED
Qty: 90 TABLET | Refills: 0 | Status: SHIPPED | OUTPATIENT
Start: 2022-05-19 | End: 2022-08-15

## 2022-05-19 RX ORDER — ESOMEPRAZOLE MAGNESIUM 40 MG/1
CAPSULE, DELAYED RELEASE ORAL
Qty: 90 CAPSULE | Refills: 0 | OUTPATIENT
Start: 2022-05-19

## 2022-05-19 RX ORDER — POTASSIUM CHLORIDE 600 MG/1
8 TABLET, FILM COATED, EXTENDED RELEASE ORAL DAILY
Qty: 90 TABLET | Refills: 0 | Status: SHIPPED | OUTPATIENT
Start: 2022-05-19 | End: 2022-08-15

## 2022-05-19 NOTE — TELEPHONE ENCOUNTER
Please call. Is patient still taking the nexium? We had it taken off her med list but pharmacy is requesting a refill.

## 2022-05-25 RX ORDER — ATENOLOL 100 MG/1
100 TABLET ORAL DAILY
Qty: 90 TABLET | Refills: 0 | Status: SHIPPED | OUTPATIENT
Start: 2022-05-25 | End: 2022-08-10

## 2022-05-31 ENCOUNTER — TELEMEDICINE (OUTPATIENT)
Dept: FAMILY MEDICINE CLINIC | Facility: CLINIC | Age: 47
End: 2022-05-31

## 2022-05-31 DIAGNOSIS — I10 PRIMARY HYPERTENSION: Primary | ICD-10-CM

## 2022-05-31 DIAGNOSIS — R60.0 BILATERAL LEG EDEMA: ICD-10-CM

## 2022-05-31 DIAGNOSIS — M79.10 MYALGIA: ICD-10-CM

## 2022-05-31 DIAGNOSIS — R23.2 HOT FLASHES: ICD-10-CM

## 2022-05-31 DIAGNOSIS — Z13.6 ENCOUNTER FOR LIPID SCREENING FOR CARDIOVASCULAR DISEASE: ICD-10-CM

## 2022-05-31 DIAGNOSIS — M25.50 ARTHRALGIA, UNSPECIFIED JOINT: ICD-10-CM

## 2022-05-31 DIAGNOSIS — E55.9 VITAMIN D DEFICIENCY: ICD-10-CM

## 2022-05-31 DIAGNOSIS — Z13.220 ENCOUNTER FOR LIPID SCREENING FOR CARDIOVASCULAR DISEASE: ICD-10-CM

## 2022-05-31 PROCEDURE — 99213 OFFICE O/P EST LOW 20 MIN: CPT | Performed by: PHYSICIAN ASSISTANT

## 2022-05-31 RX ORDER — TIZANIDINE 4 MG/1
4 TABLET ORAL EVERY 8 HOURS PRN
Qty: 90 TABLET | Refills: 1 | Status: SHIPPED | OUTPATIENT
Start: 2022-05-31 | End: 2022-07-01

## 2022-05-31 RX ORDER — ESTRADIOL 1 MG/1
1 TABLET ORAL DAILY
Qty: 90 TABLET | Refills: 1 | Status: SHIPPED | OUTPATIENT
Start: 2022-05-31 | End: 2022-10-19 | Stop reason: SDUPTHER

## 2022-05-31 RX ORDER — LAMOTRIGINE 100 MG/1
150 TABLET ORAL DAILY
COMMUNITY

## 2022-05-31 RX ORDER — LOSARTAN POTASSIUM 100 MG/1
100 TABLET ORAL DAILY
Qty: 90 TABLET | Refills: 1 | Status: SHIPPED | OUTPATIENT
Start: 2022-05-31 | End: 2022-09-23

## 2022-05-31 RX ORDER — VENLAFAXINE HYDROCHLORIDE 37.5 MG/1
37.5 CAPSULE, EXTENDED RELEASE ORAL DAILY
COMMUNITY
End: 2022-10-19

## 2022-05-31 NOTE — PROGRESS NOTES
Subjective   Francia Rivera is a 46 y.o. female.   You have chosen to receive care through a telehealth visit.  Do you consent to use a video/audio connection for your medical care today? Yes    History of Present Illness   Pt presents for follow up   Notes vimovo has been helping with arthralgia and myalgias. Ran out of medication. Needs refill. Cancelled rheumatology appointment because feeling better    Now following with behavioral health. On effexor and lamictal for bipolar disorder. Symptoms have been doing much better    F/u for HTN. Needs refill on BP medication. Not checking BP regularly at home. Denies cardiac symptoms   Swelling of legs controlled with lasix and leg elevation. Not been wearing compression socks.     F/u for hot flashes. Needs refill on estradiol. Has been making a big difference with symptoms     Due for fasting labs   The following portions of the patient's history were reviewed and updated as appropriate: allergies, current medications, past family history, past medical history, past social history, past surgical history and problem list.    Review of Systems  As noted per HPI     Objective   Physical Exam  Constitutional:       Appearance: Normal appearance.   Pulmonary:      Effort: Pulmonary effort is normal. No respiratory distress.   Neurological:      Mental Status: She is alert and oriented to person, place, and time.   Psychiatric:         Mood and Affect: Mood normal.         Behavior: Behavior normal.         Thought Content: Thought content normal.         Judgment: Judgment normal.         Assessment & Plan   Diagnoses and all orders for this visit:    1. Primary hypertension (Primary)  -     losartan (COZAAR) 100 MG tablet; Take 1 tablet by mouth Daily. Patient needs appointment for further refills  Dispense: 90 tablet; Refill: 1  -     CBC w AUTO Differential  -     Comprehensive metabolic panel    2. Hot flashes  -     estradiol (ESTRACE) 1 MG tablet; Take 1 tablet by  mouth Daily.  Dispense: 90 tablet; Refill: 1  -     TSH  -     T4, free    3. Arthralgia, unspecified joint  -     Naproxen-Esomeprazole 500-20 MG tablet delayed-release; Take 1 tablet by mouth 2 (Two) Times a Day As Needed (pain). for pain  Dispense: 180 tablet; Refill: 1  -     Sedimentation rate, automated  -     C-reactive protein    4. Myalgia  -     tiZANidine (ZANAFLEX) 4 MG tablet; Take 1 tablet by mouth Every 8 (Eight) Hours As Needed for Muscle Spasms. Patient needs appointment for further refills  Dispense: 90 tablet; Refill: 1  -     TSH  -     T4, free  -     Sedimentation rate, automated  -     C-reactive protein  -     Magnesium  -     CK    5. Vitamin D deficiency  -     Vitamin D 25 hydroxy    6. Encounter for lipid screening for cardiovascular disease  -     Lipid Panel    7. Bilateral leg edema  -     CBC w AUTO Differential  -     Comprehensive metabolic panel  -     TSH  -     T4, free    refills as outlined in plan   Return to office for fasting labs   Remain on current treatment   F/u in 6 months for annual exam     This visit has been rescheduled as a video visit to comply with patient safety concerns in accordance with CDC recommendations. Total time of discussion was 12 minutes.

## 2022-06-27 RX ORDER — ESOMEPRAZOLE MAGNESIUM 40 MG/1
CAPSULE, DELAYED RELEASE ORAL
Qty: 90 CAPSULE | Refills: 1 | Status: SHIPPED | OUTPATIENT
Start: 2022-06-27 | End: 2022-10-19 | Stop reason: SDUPTHER

## 2022-07-01 DIAGNOSIS — M79.10 MYALGIA: ICD-10-CM

## 2022-07-01 RX ORDER — TIZANIDINE 4 MG/1
TABLET ORAL
Qty: 90 TABLET | Refills: 1 | Status: SHIPPED | OUTPATIENT
Start: 2022-07-01 | End: 2022-10-19 | Stop reason: SDUPTHER

## 2022-07-27 DIAGNOSIS — M79.10 MYALGIA: ICD-10-CM

## 2022-07-28 RX ORDER — TIZANIDINE 4 MG/1
TABLET ORAL
Qty: 90 TABLET | Refills: 1 | OUTPATIENT
Start: 2022-07-28

## 2022-08-10 RX ORDER — ATENOLOL 100 MG/1
TABLET ORAL
Qty: 90 TABLET | Refills: 0 | Status: SHIPPED | OUTPATIENT
Start: 2022-08-10 | End: 2022-10-19 | Stop reason: SDUPTHER

## 2022-08-12 DIAGNOSIS — R60.9 EDEMA, UNSPECIFIED TYPE: ICD-10-CM

## 2022-08-13 LAB
25(OH)D3+25(OH)D2 SERPL-MCNC: 36.1 NG/ML (ref 30–100)
ALBUMIN SERPL-MCNC: 4.3 G/DL (ref 3.8–4.8)
ALBUMIN/GLOB SERPL: 2 {RATIO} (ref 1.2–2.2)
ALP SERPL-CCNC: 95 IU/L (ref 44–121)
ALT SERPL-CCNC: 20 IU/L (ref 0–32)
AST SERPL-CCNC: 19 IU/L (ref 0–40)
BASOPHILS # BLD AUTO: 0 X10E3/UL (ref 0–0.2)
BASOPHILS NFR BLD AUTO: 0 %
BILIRUB SERPL-MCNC: 0.4 MG/DL (ref 0–1.2)
BUN SERPL-MCNC: 9 MG/DL (ref 6–24)
BUN/CREAT SERPL: 9 (ref 9–23)
CALCIUM SERPL-MCNC: 9.2 MG/DL (ref 8.7–10.2)
CHLORIDE SERPL-SCNC: 105 MMOL/L (ref 96–106)
CHOLEST SERPL-MCNC: 181 MG/DL (ref 100–199)
CK SERPL-CCNC: 60 U/L (ref 32–182)
CO2 SERPL-SCNC: 24 MMOL/L (ref 20–29)
CREAT SERPL-MCNC: 1.03 MG/DL (ref 0.57–1)
CRP SERPL-MCNC: 9 MG/L (ref 0–10)
EGFRCR SERPLBLD CKD-EPI 2021: 67 ML/MIN/1.73
EOSINOPHIL # BLD AUTO: 0.2 X10E3/UL (ref 0–0.4)
EOSINOPHIL NFR BLD AUTO: 2 %
ERYTHROCYTE [DISTWIDTH] IN BLOOD BY AUTOMATED COUNT: 12.7 % (ref 11.7–15.4)
ERYTHROCYTE [SEDIMENTATION RATE] IN BLOOD BY WESTERGREN METHOD: 10 MM/HR (ref 0–32)
GLOBULIN SER CALC-MCNC: 2.1 G/DL (ref 1.5–4.5)
GLUCOSE SERPL-MCNC: 102 MG/DL (ref 65–99)
HCT VFR BLD AUTO: 44.5 % (ref 34–46.6)
HDLC SERPL-MCNC: 45 MG/DL
HGB BLD-MCNC: 15.2 G/DL (ref 11.1–15.9)
IMM GRANULOCYTES # BLD AUTO: 0 X10E3/UL (ref 0–0.1)
IMM GRANULOCYTES NFR BLD AUTO: 0 %
LDLC SERPL CALC-MCNC: 110 MG/DL (ref 0–99)
LYMPHOCYTES # BLD AUTO: 2.2 X10E3/UL (ref 0.7–3.1)
LYMPHOCYTES NFR BLD AUTO: 28 %
MAGNESIUM SERPL-MCNC: 2.3 MG/DL (ref 1.6–2.3)
MCH RBC QN AUTO: 29.6 PG (ref 26.6–33)
MCHC RBC AUTO-ENTMCNC: 34.2 G/DL (ref 31.5–35.7)
MCV RBC AUTO: 87 FL (ref 79–97)
MONOCYTES # BLD AUTO: 0.4 X10E3/UL (ref 0.1–0.9)
MONOCYTES NFR BLD AUTO: 6 %
NEUTROPHILS # BLD AUTO: 5 X10E3/UL (ref 1.4–7)
NEUTROPHILS NFR BLD AUTO: 64 %
PLATELET # BLD AUTO: 209 X10E3/UL (ref 150–450)
POTASSIUM SERPL-SCNC: 4.4 MMOL/L (ref 3.5–5.2)
PROT SERPL-MCNC: 6.4 G/DL (ref 6–8.5)
RBC # BLD AUTO: 5.14 X10E6/UL (ref 3.77–5.28)
SODIUM SERPL-SCNC: 143 MMOL/L (ref 134–144)
T4 FREE SERPL-MCNC: 1.3 NG/DL (ref 0.82–1.77)
TRIGL SERPL-MCNC: 148 MG/DL (ref 0–149)
TSH SERPL DL<=0.005 MIU/L-ACNC: 0.46 UIU/ML (ref 0.45–4.5)
VLDLC SERPL CALC-MCNC: 26 MG/DL (ref 5–40)
WBC # BLD AUTO: 7.8 X10E3/UL (ref 3.4–10.8)

## 2022-08-15 RX ORDER — POTASSIUM CHLORIDE 600 MG/1
TABLET, FILM COATED, EXTENDED RELEASE ORAL
Qty: 90 TABLET | Refills: 0 | Status: SHIPPED | OUTPATIENT
Start: 2022-08-15 | End: 2022-10-19 | Stop reason: SDUPTHER

## 2022-08-15 RX ORDER — FUROSEMIDE 20 MG/1
20 TABLET ORAL AS NEEDED
Qty: 90 TABLET | Refills: 0 | Status: SHIPPED | OUTPATIENT
Start: 2022-08-15 | End: 2022-10-19 | Stop reason: SDUPTHER

## 2022-08-16 LAB
HBA1C MFR BLD: 5.7 % (ref 4.8–5.6)
Lab: NORMAL
WRITTEN AUTHORIZATION: NORMAL

## 2022-09-22 DIAGNOSIS — I10 PRIMARY HYPERTENSION: ICD-10-CM

## 2022-09-23 RX ORDER — LOSARTAN POTASSIUM 100 MG/1
100 TABLET ORAL DAILY
Qty: 90 TABLET | Refills: 1 | Status: SHIPPED | OUTPATIENT
Start: 2022-09-23 | End: 2022-10-19 | Stop reason: SDUPTHER

## 2022-10-19 ENCOUNTER — OFFICE VISIT (OUTPATIENT)
Dept: FAMILY MEDICINE CLINIC | Facility: CLINIC | Age: 47
End: 2022-10-19

## 2022-10-19 VITALS
HEIGHT: 69 IN | HEART RATE: 68 BPM | BODY MASS INDEX: 42.21 KG/M2 | RESPIRATION RATE: 20 BRPM | SYSTOLIC BLOOD PRESSURE: 118 MMHG | TEMPERATURE: 97.7 F | OXYGEN SATURATION: 98 % | DIASTOLIC BLOOD PRESSURE: 80 MMHG | WEIGHT: 285 LBS

## 2022-10-19 DIAGNOSIS — Z23 NEED FOR TETANUS BOOSTER: ICD-10-CM

## 2022-10-19 DIAGNOSIS — Z23 NEED FOR INFLUENZA VACCINATION: ICD-10-CM

## 2022-10-19 DIAGNOSIS — E66.01 MORBID OBESITY: ICD-10-CM

## 2022-10-19 DIAGNOSIS — Z00.00 ENCOUNTER FOR WELL ADULT EXAM WITHOUT ABNORMAL FINDINGS: Primary | ICD-10-CM

## 2022-10-19 DIAGNOSIS — K21.9 GASTROESOPHAGEAL REFLUX DISEASE, UNSPECIFIED WHETHER ESOPHAGITIS PRESENT: ICD-10-CM

## 2022-10-19 DIAGNOSIS — M25.50 ARTHRALGIA, UNSPECIFIED JOINT: ICD-10-CM

## 2022-10-19 DIAGNOSIS — M79.10 MYALGIA: ICD-10-CM

## 2022-10-19 DIAGNOSIS — R23.2 HOT FLASHES: ICD-10-CM

## 2022-10-19 DIAGNOSIS — Z12.11 SCREENING FOR MALIGNANT NEOPLASM OF COLON: ICD-10-CM

## 2022-10-19 DIAGNOSIS — Z12.31 ENCOUNTER FOR SCREENING MAMMOGRAM FOR MALIGNANT NEOPLASM OF BREAST: ICD-10-CM

## 2022-10-19 DIAGNOSIS — R60.9 EDEMA, UNSPECIFIED TYPE: ICD-10-CM

## 2022-10-19 DIAGNOSIS — L71.9 ROSACEA: ICD-10-CM

## 2022-10-19 DIAGNOSIS — L70.0 BLACKHEAD: ICD-10-CM

## 2022-10-19 DIAGNOSIS — I10 PRIMARY HYPERTENSION: ICD-10-CM

## 2022-10-19 PROCEDURE — 90472 IMMUNIZATION ADMIN EACH ADD: CPT | Performed by: PHYSICIAN ASSISTANT

## 2022-10-19 PROCEDURE — 90715 TDAP VACCINE 7 YRS/> IM: CPT | Performed by: PHYSICIAN ASSISTANT

## 2022-10-19 PROCEDURE — 90471 IMMUNIZATION ADMIN: CPT | Performed by: PHYSICIAN ASSISTANT

## 2022-10-19 PROCEDURE — 90686 IIV4 VACC NO PRSV 0.5 ML IM: CPT | Performed by: PHYSICIAN ASSISTANT

## 2022-10-19 PROCEDURE — 99396 PREV VISIT EST AGE 40-64: CPT | Performed by: PHYSICIAN ASSISTANT

## 2022-10-19 RX ORDER — TIZANIDINE 4 MG/1
4 TABLET ORAL EVERY 8 HOURS PRN
Qty: 90 TABLET | Refills: 5 | Status: SHIPPED | OUTPATIENT
Start: 2022-10-19 | End: 2023-04-06 | Stop reason: SDUPTHER

## 2022-10-19 RX ORDER — ESOMEPRAZOLE MAGNESIUM 40 MG/1
40 CAPSULE, DELAYED RELEASE ORAL
Qty: 90 CAPSULE | Refills: 3 | Status: SHIPPED | OUTPATIENT
Start: 2022-10-19 | End: 2023-04-06 | Stop reason: SDUPTHER

## 2022-10-19 RX ORDER — FUROSEMIDE 20 MG/1
20 TABLET ORAL AS NEEDED
Qty: 90 TABLET | Refills: 3 | Status: SHIPPED | OUTPATIENT
Start: 2022-10-19 | End: 2023-04-06 | Stop reason: SDUPTHER

## 2022-10-19 RX ORDER — LAMOTRIGINE 25 MG/1
25 TABLET ORAL EVERY MORNING
COMMUNITY
Start: 2022-09-22

## 2022-10-19 RX ORDER — ESTRADIOL 1 MG/1
1 TABLET ORAL DAILY
Qty: 90 TABLET | Refills: 3 | Status: SHIPPED | OUTPATIENT
Start: 2022-10-19 | End: 2023-04-06 | Stop reason: SDUPTHER

## 2022-10-19 RX ORDER — DESVENLAFAXINE 25 MG/1
25 TABLET, EXTENDED RELEASE ORAL DAILY
COMMUNITY
Start: 2022-09-26

## 2022-10-19 RX ORDER — POTASSIUM CHLORIDE 600 MG/1
8 TABLET, FILM COATED, EXTENDED RELEASE ORAL DAILY
Qty: 90 TABLET | Refills: 3 | Status: SHIPPED | OUTPATIENT
Start: 2022-10-19 | End: 2023-04-06 | Stop reason: SDUPTHER

## 2022-10-19 RX ORDER — METRONIDAZOLE 7.5 MG/G
GEL TOPICAL 2 TIMES DAILY
Qty: 45 G | Refills: 5 | Status: SHIPPED | OUTPATIENT
Start: 2022-10-19 | End: 2023-04-06 | Stop reason: SDUPTHER

## 2022-10-19 RX ORDER — LOSARTAN POTASSIUM 100 MG/1
100 TABLET ORAL DAILY
Qty: 90 TABLET | Refills: 3 | Status: SHIPPED | OUTPATIENT
Start: 2022-10-19 | End: 2023-04-06 | Stop reason: SDUPTHER

## 2022-10-19 RX ORDER — TRAZODONE HYDROCHLORIDE 100 MG/1
100-200 TABLET ORAL
COMMUNITY
Start: 2022-09-27

## 2022-10-19 RX ORDER — NAPROXEN AND ESOMEPRAZOLE MAGNESIUM 20; 500 MG/1; MG/1
1 TABLET, DELAYED RELEASE ORAL 2 TIMES DAILY PRN
Qty: 180 TABLET | Refills: 3 | Status: SHIPPED | OUTPATIENT
Start: 2022-10-19

## 2022-10-19 RX ORDER — ATENOLOL 100 MG/1
100 TABLET ORAL DAILY
Qty: 90 TABLET | Refills: 3 | Status: SHIPPED | OUTPATIENT
Start: 2022-10-19 | End: 2023-04-06 | Stop reason: SDUPTHER

## 2022-10-19 NOTE — PROGRESS NOTES
"Chief Complaint   Patient presents with   • Annual Exam     NOTE: pt is fasting for labs / does not want to do a PAP today       Subjective   The patient is a 47 y.o. female who presents for annual exam      Last Colonoscopy:  never had colon cancer screening. Low risk. Agreeable to cologuard        Past Gynecological History:     No LMP recorded (lmp unknown). Patient has had a hysterectomy.     Due for mammogram. Will place order today     Past Medical History,Medications, Allergies reviewed.       HPI  F.u for HTN. BP well controlled in office. Needs refill on medications   Denies cardiac symptoms     Sees behavioral health     Rosacea of cheeks. Requesting topical treatment. Has been noticing more outbreaks.     Overdue for Tdap vaccine. Agreeable to obtain in office   Also requesting flu shot     vimovo has been doing well for arthralgia and myalgia. Never followed up with speciality as referred due to pain doing better.     UTD on blood work from August.   Blood sugar was slightly elevated     Sore area on back     Review of Systems   Constitutional: Negative for chills and fever.   Respiratory: Negative for cough, shortness of breath and wheezing.    Cardiovascular: Negative for chest pain.   Musculoskeletal: Positive for arthralgias and myalgias.       Objective     /80   Pulse 68   Temp 97.7 °F (36.5 °C)   Resp 20   Ht 175.3 cm (69\")   Wt 129 kg (285 lb)   LMP  (LMP Unknown)   SpO2 98%   BMI 42.09 kg/m²     Physical Exam  Vitals and nursing note reviewed.   Constitutional:       Appearance: Normal appearance. She is well-developed.   HENT:      Head: Normocephalic and atraumatic.      Right Ear: Tympanic membrane, ear canal and external ear normal.      Left Ear: Tympanic membrane, ear canal and external ear normal.      Nose: Nose normal.      Mouth/Throat:      Pharynx: No oropharyngeal exudate or posterior oropharyngeal erythema.   Eyes:      Conjunctiva/sclera: Conjunctivae normal.   Neck: "      Thyroid: No thyromegaly.      Trachea: No tracheal deviation.   Cardiovascular:      Rate and Rhythm: Normal rate and regular rhythm.      Heart sounds: Normal heart sounds.   Pulmonary:      Effort: Pulmonary effort is normal. No respiratory distress.      Breath sounds: Normal breath sounds. No wheezing or rales.   Chest:      Chest wall: No tenderness.   Abdominal:      General: Bowel sounds are normal. There is no distension.      Palpations: Abdomen is soft. There is no mass.      Tenderness: There is no abdominal tenderness. There is no guarding or rebound.      Hernia: No hernia is present.   Musculoskeletal:      Cervical back: Normal range of motion and neck supple.   Lymphadenopathy:      Cervical: No cervical adenopathy.   Skin:     General: Skin is warm and dry.      Comments: Telangiectasia of cheeks bilaterally. Small pustules of chin      Black head with induration of upper left back    Neurological:      Mental Status: She is alert and oriented to person, place, and time.   Psychiatric:         Mood and Affect: Mood normal.         Behavior: Behavior normal.         Thought Content: Thought content normal.         Judgment: Judgment normal.         Assessment & Plan     1. Encounter for well adult exam without abnormal findings  - Tdap Vaccine Greater Than or Equal To 8yo IM    2. Need for influenza vaccination  - FluLaval/Fluzone >6 mos ([id number])    3. Edema, unspecified type  - furosemide (LASIX) 20 MG tablet; Take 1 tablet by mouth As Needed (edema).  Dispense: 90 tablet; Refill: 3  - potassium chloride (KLOR-CON) 8 MEQ CR tablet; Take 1 tablet by mouth Daily.  Dispense: 90 tablet; Refill: 3    4. Primary hypertension  - losartan (COZAAR) 100 MG tablet; Take 1 tablet by mouth Daily. Patient needs appointment for further refills  Dispense: 90 tablet; Refill: 3  - atenolol (TENORMIN) 100 MG tablet; Take 1 tablet by mouth Daily.  Dispense: 90 tablet; Refill: 3    5. Hot flashes  - estradiol  (ESTRACE) 1 MG tablet; Take 1 tablet by mouth Daily.  Dispense: 90 tablet; Refill: 3    6. Arthralgia, unspecified joint  - Naproxen-Esomeprazole Mg (Vimovo) 500-20 MG tablet delayed-release; Take 1 tablet by mouth 2 (Two) Times a Day As Needed (joint pain).  Dispense: 180 tablet; Refill: 3    7. Gastroesophageal reflux disease, unspecified whether esophagitis present  - esomeprazole (nexIUM) 40 MG capsule; Take 1 capsule by mouth Every Morning Before Breakfast.  Dispense: 90 capsule; Refill: 3    8. Myalgia  - tiZANidine (ZANAFLEX) 4 MG tablet; Take 1 tablet by mouth Every 8 (Eight) Hours As Needed for Muscle Spasms.  Dispense: 90 tablet; Refill: 5    9. Screening for malignant neoplasm of colon  - Cologuard - Stool, Per Rectum; Future    10. Need for tetanus booster  - Tdap Vaccine Greater Than or Equal To 8yo IM    11. Encounter for screening mammogram for malignant neoplasm of breast  - Mammo screening digital tomosynthesis bilateral w CAD; Future    12. Rosacea  - Ambulatory Referral to Dermatology  - metroNIDAZOLE (METROGEL) 0.75 % gel; Apply  topically to the appropriate area as directed 2 (Two) Times a Day.  Dispense: 45 g; Refill: 5    13. Blackhead  - Ambulatory Referral to Dermatology    14. Morbid obesity (HCC)  -per BMI     Labs and refills as outlined in plan   Referral to dermatology as noted for blackhead and rosacea. metrogel topically for face   tdap and flu vaccine updated with patient's consent   cologuard and mammogram ordered     Counseling was given to patient for the following topics: diagnostic results, instructions for management, risk factor reductions, patient and family education and impressions . Total time of the encounter was 30 minutes and 15 minutes was spent counseling.    F/u as directed     CELENA Arreaga

## 2023-02-14 DIAGNOSIS — I10 PRIMARY HYPERTENSION: ICD-10-CM

## 2023-02-14 RX ORDER — ATENOLOL 100 MG/1
100 TABLET ORAL DAILY
Qty: 90 TABLET | Refills: 3 | OUTPATIENT
Start: 2023-02-14

## 2023-03-04 DIAGNOSIS — M79.10 MYALGIA: ICD-10-CM

## 2023-03-06 RX ORDER — TIZANIDINE 4 MG/1
TABLET ORAL
Qty: 270 TABLET | Refills: 1 | OUTPATIENT
Start: 2023-03-06

## 2023-04-04 ENCOUNTER — TELEPHONE (OUTPATIENT)
Dept: FAMILY MEDICINE CLINIC | Facility: CLINIC | Age: 48
End: 2023-04-04
Payer: MEDICAID

## 2023-04-05 ENCOUNTER — TELEPHONE (OUTPATIENT)
Dept: FAMILY MEDICINE CLINIC | Facility: CLINIC | Age: 48
End: 2023-04-05
Payer: MEDICAID

## 2023-04-05 NOTE — TELEPHONE ENCOUNTER
PA for naproxen-esomprazole approved 4/4/23 to 4/4/24 mas 12 fills LVM to advise pt-pharmacy aware-ID [id number]

## 2023-04-06 DIAGNOSIS — R23.2 HOT FLASHES: ICD-10-CM

## 2023-04-06 DIAGNOSIS — I10 PRIMARY HYPERTENSION: ICD-10-CM

## 2023-04-06 DIAGNOSIS — R60.9 EDEMA, UNSPECIFIED TYPE: ICD-10-CM

## 2023-04-06 DIAGNOSIS — K21.9 GASTROESOPHAGEAL REFLUX DISEASE, UNSPECIFIED WHETHER ESOPHAGITIS PRESENT: ICD-10-CM

## 2023-04-06 DIAGNOSIS — L71.9 ROSACEA: ICD-10-CM

## 2023-04-06 DIAGNOSIS — M79.10 MYALGIA: ICD-10-CM

## 2023-04-06 RX ORDER — TIZANIDINE 4 MG/1
TABLET ORAL
Qty: 270 TABLET | Refills: 1 | OUTPATIENT
Start: 2023-04-06

## 2023-04-06 RX ORDER — FUROSEMIDE 20 MG/1
20 TABLET ORAL AS NEEDED
Qty: 14 TABLET | Refills: 0 | Status: SHIPPED | OUTPATIENT
Start: 2023-04-06 | End: 2023-04-19 | Stop reason: SDUPTHER

## 2023-04-06 RX ORDER — ATENOLOL 100 MG/1
100 TABLET ORAL DAILY
Qty: 14 TABLET | Refills: 2 | Status: SHIPPED | OUTPATIENT
Start: 2023-04-06 | End: 2023-04-19 | Stop reason: SDUPTHER

## 2023-04-06 RX ORDER — LOSARTAN POTASSIUM 100 MG/1
100 TABLET ORAL DAILY
Qty: 14 TABLET | Refills: 0 | Status: SHIPPED | OUTPATIENT
Start: 2023-04-06 | End: 2023-04-19 | Stop reason: SDUPTHER

## 2023-04-06 RX ORDER — ESTRADIOL 1 MG/1
1 TABLET ORAL DAILY
Qty: 14 TABLET | Refills: 0 | Status: SHIPPED | OUTPATIENT
Start: 2023-04-06 | End: 2023-04-19 | Stop reason: SDUPTHER

## 2023-04-06 RX ORDER — METRONIDAZOLE 7.5 MG/G
GEL TOPICAL 2 TIMES DAILY
Qty: 45 G | Refills: 0 | Status: SHIPPED | OUTPATIENT
Start: 2023-04-06 | End: 2023-04-19 | Stop reason: SDUPTHER

## 2023-04-06 RX ORDER — ESOMEPRAZOLE MAGNESIUM 40 MG/1
40 CAPSULE, DELAYED RELEASE ORAL
Qty: 14 CAPSULE | Refills: 0 | Status: SHIPPED | OUTPATIENT
Start: 2023-04-06 | End: 2023-04-19 | Stop reason: SDUPTHER

## 2023-04-06 RX ORDER — POTASSIUM CHLORIDE 600 MG/1
8 TABLET, FILM COATED, EXTENDED RELEASE ORAL DAILY
Qty: 14 TABLET | Refills: 0 | Status: SHIPPED | OUTPATIENT
Start: 2023-04-06 | End: 2023-04-19 | Stop reason: SDUPTHER

## 2023-04-06 RX ORDER — TIZANIDINE 4 MG/1
4 TABLET ORAL EVERY 8 HOURS PRN
Qty: 21 TABLET | Refills: 0 | Status: SHIPPED | OUTPATIENT
Start: 2023-04-06 | End: 2023-04-19 | Stop reason: SDUPTHER

## 2023-04-19 ENCOUNTER — OFFICE VISIT (OUTPATIENT)
Dept: FAMILY MEDICINE CLINIC | Facility: CLINIC | Age: 48
End: 2023-04-19
Payer: MEDICAID

## 2023-04-19 DIAGNOSIS — L71.9 ROSACEA: ICD-10-CM

## 2023-04-19 DIAGNOSIS — M75.121 NONTRAUMATIC COMPLETE TEAR OF RIGHT ROTATOR CUFF: ICD-10-CM

## 2023-04-19 DIAGNOSIS — R73.03 PREDIABETES: Primary | ICD-10-CM

## 2023-04-19 DIAGNOSIS — I10 PRIMARY HYPERTENSION: ICD-10-CM

## 2023-04-19 DIAGNOSIS — R23.2 HOT FLASHES: ICD-10-CM

## 2023-04-19 DIAGNOSIS — K21.9 GASTROESOPHAGEAL REFLUX DISEASE, UNSPECIFIED WHETHER ESOPHAGITIS PRESENT: ICD-10-CM

## 2023-04-19 DIAGNOSIS — M79.10 MYALGIA: ICD-10-CM

## 2023-04-19 DIAGNOSIS — R60.9 EDEMA, UNSPECIFIED TYPE: ICD-10-CM

## 2023-04-19 PROCEDURE — 99214 OFFICE O/P EST MOD 30 MIN: CPT | Performed by: PHYSICIAN ASSISTANT

## 2023-04-19 RX ORDER — ALBUTEROL SULFATE 90 UG/1
AEROSOL, METERED RESPIRATORY (INHALATION)
COMMUNITY
Start: 2023-02-13

## 2023-04-19 RX ORDER — MOMETASONE FUROATE 1 MG/G
OINTMENT TOPICAL
COMMUNITY
Start: 2023-03-20

## 2023-04-19 RX ORDER — ESTRADIOL 1 MG/1
1 TABLET ORAL DAILY
Qty: 90 TABLET | Refills: 3 | Status: SHIPPED | OUTPATIENT
Start: 2023-04-19

## 2023-04-19 RX ORDER — METRONIDAZOLE 7.5 MG/G
GEL TOPICAL 2 TIMES DAILY
Qty: 45 G | Refills: 11 | Status: SHIPPED | OUTPATIENT
Start: 2023-04-19

## 2023-04-19 RX ORDER — TIZANIDINE 4 MG/1
4 TABLET ORAL EVERY 8 HOURS PRN
Qty: 30 TABLET | Refills: 11 | Status: SHIPPED | OUTPATIENT
Start: 2023-04-19

## 2023-04-19 RX ORDER — ESOMEPRAZOLE MAGNESIUM 40 MG/1
40 CAPSULE, DELAYED RELEASE ORAL
Qty: 90 CAPSULE | Refills: 3 | Status: SHIPPED | OUTPATIENT
Start: 2023-04-19

## 2023-04-19 RX ORDER — POTASSIUM CHLORIDE 600 MG/1
8 TABLET, FILM COATED, EXTENDED RELEASE ORAL DAILY
Qty: 90 TABLET | Refills: 3 | Status: SHIPPED | OUTPATIENT
Start: 2023-04-19

## 2023-04-19 RX ORDER — BUSPIRONE HYDROCHLORIDE 5 MG/1
5 TABLET ORAL 3 TIMES DAILY
COMMUNITY
Start: 2023-04-12

## 2023-04-19 RX ORDER — TRETINOIN 0.025 %
CREAM (GRAM) TOPICAL
COMMUNITY
Start: 2022-12-24

## 2023-04-19 RX ORDER — LURASIDONE HYDROCHLORIDE 40 MG/1
TABLET, FILM COATED ORAL
COMMUNITY
Start: 2023-04-18

## 2023-04-19 RX ORDER — FUROSEMIDE 20 MG/1
20 TABLET ORAL AS NEEDED
Qty: 90 TABLET | Refills: 3 | Status: SHIPPED | OUTPATIENT
Start: 2023-04-19

## 2023-04-19 RX ORDER — ATENOLOL 100 MG/1
100 TABLET ORAL DAILY
Qty: 90 TABLET | Refills: 3 | Status: SHIPPED | OUTPATIENT
Start: 2023-04-19

## 2023-04-19 RX ORDER — LOSARTAN POTASSIUM 100 MG/1
100 TABLET ORAL DAILY
Qty: 90 TABLET | Refills: 3 | Status: SHIPPED | OUTPATIENT
Start: 2023-04-19

## 2023-04-19 RX ORDER — DESVENLAFAXINE SUCCINATE 50 MG/1
50 TABLET, EXTENDED RELEASE ORAL DAILY
COMMUNITY

## 2023-04-19 NOTE — PROGRESS NOTES
Subjective   Francia Rivera is a 47 y.o. female.     History of Present Illness   Pt presents for follow up   Prediabetes. Due for lab recheck.  Increased water intake and trying to work on well balanced nutrition   GERD well controlled on nexium. Needs refills.   BP well controlled on current treatment. Denies cardiac symptoms.   Has rotator cuff tear of R shoulder. Last MRI in 2020. Never followed up with ortho as referred. Requesting new referral today. Limited ROM and continued pain of shoulder.     The following portions of the patient's history were reviewed and updated as appropriate: allergies, current medications, past family history, past medical history, past social history, past surgical history and problem list.      Objective      Physical Exam  Vitals and nursing note reviewed.   Constitutional:       Appearance: Normal appearance.   HENT:      Head: Normocephalic.      Right Ear: External ear normal.   Cardiovascular:      Rate and Rhythm: Normal rate and regular rhythm.   Pulmonary:      Effort: Pulmonary effort is normal.      Breath sounds: Normal breath sounds.   Musculoskeletal:      Right shoulder: Tenderness and bony tenderness present. Decreased range of motion.   Skin:     General: Skin is warm and dry.   Neurological:      Mental Status: She is alert and oriented to person, place, and time.   Psychiatric:         Mood and Affect: Mood normal.         Behavior: Behavior normal.         Assessment & Plan   Diagnoses and all orders for this visit:    1. Prediabetes (Primary)  -     CBC w AUTO Differential  -     Comprehensive metabolic panel  -     Lipid Panel  -     Hemoglobin A1c    2. Gastroesophageal reflux disease, unspecified whether esophagitis present  -     esomeprazole (nexIUM) 40 MG capsule; Take 1 capsule by mouth Every Morning Before Breakfast.  Dispense: 90 capsule; Refill: 3    3. Myalgia  -     tiZANidine (ZANAFLEX) 4 MG tablet; Take 1 tablet by mouth Every 8 (Eight) Hours As  Needed for Muscle Spasms.  Dispense: 30 tablet; Refill: 11    4. Primary hypertension  -     atenolol (TENORMIN) 100 MG tablet; Take 1 tablet by mouth Daily.  Dispense: 90 tablet; Refill: 3  -     losartan (COZAAR) 100 MG tablet; Take 1 tablet by mouth Daily.  Dispense: 90 tablet; Refill: 3    5. Nontraumatic complete tear of right rotator cuff  -     Ambulatory Referral to Orthopedic Surgery  -     MRI Shoulder Right Without Contrast    6. Edema, unspecified type  -     furosemide (LASIX) 20 MG tablet; Take 1 tablet by mouth As Needed (edema).  Dispense: 90 tablet; Refill: 3  -     potassium chloride (KLOR-CON) 8 MEQ CR tablet; Take 1 tablet by mouth Daily.  Dispense: 90 tablet; Refill: 3    7. Rosacea  -     metroNIDAZOLE (METROGEL) 0.75 % gel; Apply  topically to the appropriate area as directed 2 (Two) Times a Day.  Dispense: 45 g; Refill: 11    8. Hot flashes  -     estradiol (ESTRACE) 1 MG tablet; Take 1 tablet by mouth Daily.  Dispense: 90 tablet; Refill: 3    labs and refills as outlined in plan   New referral to ortho placed and will update shoulder MRI

## 2023-04-20 LAB
ALBUMIN SERPL-MCNC: 3.9 G/DL (ref 3.5–5.2)
ALBUMIN/GLOB SERPL: 1.5 G/DL
ALP SERPL-CCNC: 79 U/L (ref 39–117)
ALT SERPL-CCNC: 16 U/L (ref 1–33)
AST SERPL-CCNC: 15 U/L (ref 1–32)
BASOPHILS # BLD AUTO: 0.01 10*3/MM3 (ref 0–0.2)
BASOPHILS NFR BLD AUTO: 0.1 % (ref 0–1.5)
BILIRUB SERPL-MCNC: 0.3 MG/DL (ref 0–1.2)
BUN SERPL-MCNC: 18 MG/DL (ref 6–20)
BUN/CREAT SERPL: 18.4 (ref 7–25)
CALCIUM SERPL-MCNC: 9.6 MG/DL (ref 8.6–10.5)
CHLORIDE SERPL-SCNC: 104 MMOL/L (ref 98–107)
CHOLEST SERPL-MCNC: 195 MG/DL (ref 0–200)
CO2 SERPL-SCNC: 29.3 MMOL/L (ref 22–29)
CREAT SERPL-MCNC: 0.98 MG/DL (ref 0.57–1)
EGFRCR SERPLBLD CKD-EPI 2021: 71.8 ML/MIN/1.73
EOSINOPHIL # BLD AUTO: 0.27 10*3/MM3 (ref 0–0.4)
EOSINOPHIL NFR BLD AUTO: 3.2 % (ref 0.3–6.2)
ERYTHROCYTE [DISTWIDTH] IN BLOOD BY AUTOMATED COUNT: 12.7 % (ref 12.3–15.4)
GLOBULIN SER CALC-MCNC: 2.6 GM/DL
GLUCOSE SERPL-MCNC: 86 MG/DL (ref 65–99)
HBA1C MFR BLD: 5.3 % (ref 4.8–5.6)
HCT VFR BLD AUTO: 46.5 % (ref 34–46.6)
HDLC SERPL-MCNC: 56 MG/DL (ref 40–60)
HGB BLD-MCNC: 15.1 G/DL (ref 12–15.9)
IMM GRANULOCYTES # BLD AUTO: 0.02 10*3/MM3 (ref 0–0.05)
IMM GRANULOCYTES NFR BLD AUTO: 0.2 % (ref 0–0.5)
LDLC SERPL CALC-MCNC: 125 MG/DL (ref 0–100)
LYMPHOCYTES # BLD AUTO: 2.17 10*3/MM3 (ref 0.7–3.1)
LYMPHOCYTES NFR BLD AUTO: 25.5 % (ref 19.6–45.3)
MCH RBC QN AUTO: 28.8 PG (ref 26.6–33)
MCHC RBC AUTO-ENTMCNC: 32.5 G/DL (ref 31.5–35.7)
MCV RBC AUTO: 88.6 FL (ref 79–97)
MONOCYTES # BLD AUTO: 0.46 10*3/MM3 (ref 0.1–0.9)
MONOCYTES NFR BLD AUTO: 5.4 % (ref 5–12)
NEUTROPHILS # BLD AUTO: 5.59 10*3/MM3 (ref 1.7–7)
NEUTROPHILS NFR BLD AUTO: 65.6 % (ref 42.7–76)
NRBC BLD AUTO-RTO: 0 /100 WBC (ref 0–0.2)
PLATELET # BLD AUTO: 207 10*3/MM3 (ref 140–450)
POTASSIUM SERPL-SCNC: 4.9 MMOL/L (ref 3.5–5.2)
PROT SERPL-MCNC: 6.5 G/DL (ref 6–8.5)
RBC # BLD AUTO: 5.25 10*6/MM3 (ref 3.77–5.28)
SODIUM SERPL-SCNC: 143 MMOL/L (ref 136–145)
TRIGL SERPL-MCNC: 75 MG/DL (ref 0–150)
VLDLC SERPL CALC-MCNC: 14 MG/DL (ref 5–40)
WBC # BLD AUTO: 8.52 10*3/MM3 (ref 3.4–10.8)

## 2023-04-27 ENCOUNTER — OFFICE VISIT (OUTPATIENT)
Dept: ORTHOPEDIC SURGERY | Facility: CLINIC | Age: 48
End: 2023-04-27
Payer: MEDICAID

## 2023-04-27 VITALS
SYSTOLIC BLOOD PRESSURE: 130 MMHG | HEIGHT: 69 IN | WEIGHT: 272 LBS | BODY MASS INDEX: 40.29 KG/M2 | DIASTOLIC BLOOD PRESSURE: 86 MMHG

## 2023-04-27 DIAGNOSIS — M75.41 IMPINGEMENT SYNDROME OF RIGHT SHOULDER: ICD-10-CM

## 2023-04-27 DIAGNOSIS — M75.51 BURSITIS OF RIGHT SHOULDER: ICD-10-CM

## 2023-04-27 DIAGNOSIS — M25.511 RIGHT SHOULDER PAIN, UNSPECIFIED CHRONICITY: ICD-10-CM

## 2023-04-27 DIAGNOSIS — G56.01 CARPAL TUNNEL SYNDROME OF RIGHT WRIST: ICD-10-CM

## 2023-04-27 DIAGNOSIS — M75.111 NONTRAUMATIC INCOMPLETE TEAR OF RIGHT ROTATOR CUFF: Primary | ICD-10-CM

## 2023-04-27 PROCEDURE — 3075F SYST BP GE 130 - 139MM HG: CPT | Performed by: ORTHOPAEDIC SURGERY

## 2023-04-27 PROCEDURE — 99214 OFFICE O/P EST MOD 30 MIN: CPT | Performed by: ORTHOPAEDIC SURGERY

## 2023-04-27 PROCEDURE — 3079F DIAST BP 80-89 MM HG: CPT | Performed by: ORTHOPAEDIC SURGERY

## 2023-04-27 RX ORDER — TRAZODONE HYDROCHLORIDE 50 MG/1
TABLET ORAL
COMMUNITY
Start: 2023-04-23

## 2023-04-27 NOTE — PROGRESS NOTES
"                                                                [facility] Follow Up       Office Follow Up Visit       Patient Name: Francia Rivera    Chief Complaint:   Chief Complaint   Patient presents with   • Follow-up     2.5 year follow up - Nontraumatic incomplete tear of right rotator cuff        Referring Physician: Mario Bourne PA    History of Present Illness:   It has been 2.5  year(s) since Francia Rivera's last visit. Francia Rivera returns to clinic today for F/U: follow-up of rightBody Part: shoulderReason: pain. The issue has been ongoing for 8 year(s). Francia Rivera rates HIS/HER: herpain at 5/10 on the pain scale. Previous/current treatments: NSAIDS and oral steroids. Current symptoms:Symptoms: pain, popping, grinding, stiffness and same as prior visit. The pain is worse with working and lying on affected side; ice and pain medication and/or NSAID improves the pain. Overall, he/she: sheis doing the same.  I have reviewed the patient's history of present illness as noted/entered above.    I have reviewed the patient's past medical history, surgical history, social history, family history, medications, and allergies as noted in the electronic medical record and as noted/entered.  I have reviewed the patient's review of systems as noted/enter and updated as noted in the patient's HPI.    Persistent right shoulder pain worse over the last 1 month rates the pain is up to 9 of 10  Right shoulder injection April 2020  Treated with tizanidine and steroid Dosepak and NSAIDs  Occupation      3 years ago - wrestling her 17yr old son and shoulder got \"rolled\"  Worse last month and felt a \"pop\", \"hot poker\" and hurt down to her elbow  Some hand weakness - \"no strength in this hand\"  RHD  Numbness and tingling in right hand     Neck pain - I reviewed her  neck xrays - degenerative changes     From [place] -- moved to be near her grandson     Neck work-up " initiated     The shoulder pain is more in the scapula but a lot of her concerns her hand weakness numbness and tingling    I personally reviewed the outside hospital MRI noncontrast right shoulder completed at [facility] on 10/5/2020.  I also reviewed the report.  She has partial tearing of the anterior portion of the supraspinatus that may have some focal full-thickness extension.  Some mild degenerative changes of the posterior labrum.  Mild AC joint degenerative changes.     Printed representative images and reviewed the MRI with the patient and provided the images.    2023:  Right shoulder  Patient presents after a long delay.  I saw her last 10/22/2020   2.5 years ago.  At that time she had hand numbness and tingling concerning for possible neck involvement.  She ultimately had an EMG/NCV completed by Dr. Phipps: 2020 the findings showed fairly significant right-sided carpal tunnel syndrome and milder on the left side.  No obvious cervical radiculopathy noted.  Unfortunately, her mother was ill, dementia at that time and she was unable to arrange time to follow back up.  I am glad that she has represented today to discuss about her ongoing symptomatology.      Carpal tunnel syndrome clinically worse -- hand cramps right more than left; no focal motor weakness but continued CT symptoms on right more so than left    Fortunately no focal weakness of the hand but continues to have carpal tunnel symptomatology    Right shoulder also remains painful.  She already has an MRI of the shoulder scheduled for 2023    Having granddaughter, sees grandson daily          Subjective   Subjective      Review of Systems   Musculoskeletal: Positive for arthralgias.   All other systems reviewed and are negative.       Past Medical History:   Past Medical History:   Diagnosis Date   • Allergic    • Arthritis    • Depression    • Diabetes mellitus    • GERD (gastroesophageal reflux disease)    •  Hypertension    • Scoliosis        Past Surgical History:   Past Surgical History:   Procedure Laterality Date   • APPENDECTOMY     •  SECTION     • CHOLECYSTECTOMY     • HYSTERECTOMY         Family History:   Family History   Problem Relation Age of Onset   • Arthritis Mother    • Mental illness Mother    • Osteoporosis Mother    • Arthritis Father        Social History:   Social History     Socioeconomic History   • Marital status:    Tobacco Use   • Smoking status: Every Day     Packs/day: 1.00     Years: 15.00     Pack years: 15.00     Types: Cigarettes, Electronic Cigarette   • Smokeless tobacco: Never   • Tobacco comments:     and Vapes   Vaping Use   • Vaping Use: Every day   Substance and Sexual Activity   • Alcohol use: Yes     Alcohol/week: 3.0 standard drinks     Types: 1 Glasses of wine, 1 Cans of beer, 1 Shots of liquor per week     Comment: 1-2 drinks daily   • Drug use: Never   • Sexual activity: Yes     Partners: Male     Birth control/protection: Hysterectomy       Medications:   Current Outpatient Medications:   •  albuterol sulfate  (90 Base) MCG/ACT inhaler, 1-2 PUFFS EVERY 4-6 HOURS AS NEEDED AS NEEDED FOR SHORTNESS OF BREATH, Disp: , Rfl:   •  atenolol (TENORMIN) 100 MG tablet, Take 1 tablet by mouth Daily., Disp: 90 tablet, Rfl: 3  •  busPIRone (BUSPAR) 5 MG tablet, Take 1 tablet by mouth 3 (Three) Times a Day. Can take up to 3 daily, Disp: , Rfl:   •  desvenlafaxine (PRISTIQ) 50 MG 24 hr tablet, Take 1 tablet by mouth Daily., Disp: , Rfl:   •  esomeprazole (nexIUM) 40 MG capsule, Take 1 capsule by mouth Every Morning Before Breakfast., Disp: 90 capsule, Rfl: 3  •  estradiol (ESTRACE) 1 MG tablet, Take 1 tablet by mouth Daily., Disp: 90 tablet, Rfl: 3  •  furosemide (LASIX) 20 MG tablet, Take 1 tablet by mouth As Needed (edema)., Disp: 90 tablet, Rfl: 3  •  lamoTRIgine (LaMICtal) 100 MG tablet, Take 1.5 tablets by mouth Daily. Pt take 150mg tablet daily, Disp: , Rfl:  "  •  losartan (COZAAR) 100 MG tablet, Take 1 tablet by mouth Daily., Disp: 90 tablet, Rfl: 3  •  lurasidone (LATUDA) 40 MG tablet tablet, , Disp: , Rfl:   •  metroNIDAZOLE (METROGEL) 0.75 % gel, Apply  topically to the appropriate area as directed 2 (Two) Times a Day., Disp: 45 g, Rfl: 11  •  mometasone (ELOCON) 0.1 % ointment, PLEASE SEE ATTACHED FOR DETAILED DIRECTIONS, Disp: , Rfl:   •  ondansetron ODT (Zofran ODT) 4 MG disintegrating tablet, Place 1 tablet on the tongue Every 8 (Eight) Hours As Needed for Nausea or Vomiting., Disp: 20 tablet, Rfl: 0  •  potassium chloride (KLOR-CON) 8 MEQ CR tablet, Take 1 tablet by mouth Daily., Disp: 90 tablet, Rfl: 3  •  Retin-A 0.025 % cream, APPLY A PEA SIZED AMOUNT TO THE CHIN AREA AT BEDTIME, Disp: , Rfl:   •  tiZANidine (ZANAFLEX) 4 MG tablet, Take 1 tablet by mouth Every 8 (Eight) Hours As Needed for Muscle Spasms., Disp: 30 tablet, Rfl: 11  •  traZODone (DESYREL) 100 MG tablet, Take 1-2 tablets by mouth every night at bedtime., Disp: , Rfl:   •  traZODone (DESYREL) 50 MG tablet, TAKE 2 TABLET BY MOUTH EVERY EVENING AS NEEDED FOR SLEEP, Disp: , Rfl:     Allergies:   Allergies   Allergen Reactions   • Amoxicillin-Pot Clavulanate Other (See Comments)   • Sulfa Antibiotics Anaphylaxis       The following portions of the patient's history were reviewed and updated as appropriate: allergies, current medications, past family history, past medical history, past social history, past surgical history and problem list.        Objective    Objective      Vital Signs:   Vitals:    04/27/23 1437   BP: 130/86   Weight: 123 kg (272 lb)   Height: 175.3 cm (69.02\")       Ortho Exam:  Right hand symptomatology notable for carpal tunnel syndrome she describes more hand cramping than trigger digits but does feel like the index and long finger do have more the symptomatology.  She has numbness and tingling but no focal motor weakness    Right shoulder has generalized pain pain with rotator " cuff testing    General: no acute distress, comfortable  Vitals reviewed in chart    Musculoskeletal Exam    SIDE: Right shoulder  Shoulder Exam:    Tenderness: rotator cuff    Range of motion measurements (degrees)  Forward flexion/Abduction/External rotation at side/ER at 90/IR at 90/IR position  Active: Pain limited 130/130/45/80/70  Passive: Pain limited    Painful arc of motion: yes  No evidence of septic joint  Pain with forward flexion and abduction greater: 90 degrees  Impingement testing Neer's test - positive/painful  Impingement testing Hawkin's test - positive/painful    Rotator Cuff Testing:  Tenderness to palpation at rotator cuff - YES  Rotator cuff testing Rosario's test - painful  Rotator cuff testing External rotation - no  Rotator cuff testing Lag signs - no  Rotator cuff testing Belly press - no  Pain with abduction great than 90 degrees - yes    Scapular dyskinesis - present, abnormal scapular motion    Long head of the biceps testing:  Pena's test for biceps & Speed's test - mild  Bicipital groove tenderness to palpation/tenderness to palpation of biceps tendon - mild        Results Review:  Imaging Results (Last 24 Hours)     Procedure Component Value Units Date/Time    XR Shoulder 2+ View Right [[id number]] Resulted: 04/27/23 1506     Updated: 04/27/23 1507    Narrative:      Imaging: shoulder x-rays 2 views - AP and axillary x-ray views    Side: RIGHT SHOULDER    Indication for shoulder x-ray 2 views: shoulder pain    Comparison: prior clinic comparison views available    Findings: No acute bony pathology. No superior humeral head migration.    The humeral head remains centered in the glenohumeral joint. No evidence   of calcific tendonitis.  No acute bony findings when compared to prior images.  Mild baseline AC   joint changes.    I personally reviewed the above x-rays.            Procedures            Assessment / Plan      Assessment/Plan:   Problem List Items Addressed This Visit         Musculoskeletal and Injuries    Nontraumatic incomplete tear of right rotator cuff - Primary    Bursitis of right shoulder    Impingement syndrome of right shoulder    Right shoulder pain    Relevant Orders    XR Shoulder 2+ View Right (Completed)       Neuro    Carpal tunnel syndrome of right wrist       Right shoulder known partial rotator cuff tearing with perhaps a small portion that has full-thickness extension.  This was from an MRI in 2020 we need an updated MRI at this time she does have one scheduled for 6/2/2023.  I am happy to see her back following the MRI to assess whether tear propagation has occurred over time.    Right worse than left carpal tunnel syndrome.  I think it is warranted at this time to consider carpal tunnel release.  I would like for her to see Dr. Ben Babb to discuss possible carpal tunnel release at this time.    Follow Up: I will see her back after her right shoulder MRI is completed      Fortino Ordoñez MD, FAAOS  Orthopedic Surgeon  Fellowship Trained Shoulder and Elbow Surgeon  [facility]  Orthopedics and Sports Medicine  [address] [place] [address], [address]  [place] [address]    04/27/23  15:25 EDT

## 2024-02-14 ENCOUNTER — HOSPITAL ENCOUNTER (OUTPATIENT)
Age: 49
End: 2024-02-14
Payer: COMMERCIAL

## 2024-02-14 DIAGNOSIS — Z12.31: Primary | ICD-10-CM

## 2024-02-14 PROCEDURE — 77067 SCR MAMMO BI INCL CAD: CPT

## 2024-02-14 PROCEDURE — 77063 BREAST TOMOSYNTHESIS BI: CPT

## 2024-02-14 NOTE — MM_ITS
PROCEDURE INFORMATION:  
Exam: MG Bilateral Screening 3D Mammography  
Exam date and time: 2/14/2024 1:15 PM  
Age: 48 years old  
Clinical indication: Screening examination  
 
TECHNIQUE:  
Imaging protocol: Bilateral Screening tomosynthesis and 2D  
mammography  
including computer-aided detection (CAD) when performed.  
 
COMPARISON:  
No relevant prior studies available.  
 
FINDINGS:  
 
MAMMOGRAPHY:  
Breast composition: The breasts are almost entirely fatty.  
Mass: None.  
Architectural distortion: None.  
Calcifications: No suspicious calcifications.  
Asymmetric density: None.  
Skin thickening: None.  
Axillary adenopathy: None.  
 
IMPRESSION:  
No mammographic evidence of malignancy. Annual screening is  
recommended unless  
otherwise clinically indicated.  
 
ASSESSMENT:  
BI-RADS Category 1: Negative  
 
Electronically signed by Franny Vega MD at 03/11/2024 09:30

## 2024-02-21 ENCOUNTER — TELEPHONE (OUTPATIENT)
Dept: FAMILY MEDICINE CLINIC | Facility: CLINIC | Age: 49
End: 2024-02-21
Payer: MEDICAID

## 2024-02-21 NOTE — TELEPHONE ENCOUNTER
LVM-Per Mario need to verify if pt is taking the atenolol ([place]) and propranolol ([place], Tiara) together-should only be taking one of these

## 2024-02-28 ENCOUNTER — TELEPHONE (OUTPATIENT)
Dept: FAMILY MEDICINE CLINIC | Facility: CLINIC | Age: 49
End: 2024-02-28
Payer: MEDICAID

## 2024-02-28 NOTE — TELEPHONE ENCOUNTER
LVM-verify which medication pt is taking the propranolol or the atenolol-shouldn't be taking both at same time-pt requesting refills

## 2024-04-15 DIAGNOSIS — M79.10 MYALGIA: ICD-10-CM

## 2024-04-15 RX ORDER — TIZANIDINE 4 MG/1
4 TABLET ORAL EVERY 8 HOURS PRN
Qty: 30 TABLET | Refills: 1 | Status: SHIPPED | OUTPATIENT
Start: 2024-04-15

## 2024-05-02 ENCOUNTER — TELEPHONE (OUTPATIENT)
Dept: FAMILY MEDICINE CLINIC | Facility: CLINIC | Age: 49
End: 2024-05-02
Payer: MEDICAID

## 2024-06-17 DIAGNOSIS — M79.10 MYALGIA: ICD-10-CM

## 2024-06-17 RX ORDER — TIZANIDINE 4 MG/1
4 TABLET ORAL EVERY 8 HOURS PRN
Qty: 30 TABLET | Refills: 0 | OUTPATIENT
Start: 2024-06-17